# Patient Record
Sex: MALE | Race: ASIAN | NOT HISPANIC OR LATINO | URBAN - METROPOLITAN AREA
[De-identification: names, ages, dates, MRNs, and addresses within clinical notes are randomized per-mention and may not be internally consistent; named-entity substitution may affect disease eponyms.]

---

## 2023-10-04 ENCOUNTER — INPATIENT (INPATIENT)
Facility: HOSPITAL | Age: 53
LOS: 1 days | Discharge: HOME | End: 2023-10-06
Attending: STUDENT IN AN ORGANIZED HEALTH CARE EDUCATION/TRAINING PROGRAM | Admitting: STUDENT IN AN ORGANIZED HEALTH CARE EDUCATION/TRAINING PROGRAM
Payer: MEDICAID

## 2023-10-04 VITALS
RESPIRATION RATE: 16 BRPM | DIASTOLIC BLOOD PRESSURE: 90 MMHG | HEART RATE: 74 BPM | SYSTOLIC BLOOD PRESSURE: 140 MMHG | TEMPERATURE: 97 F | OXYGEN SATURATION: 98 %

## 2023-10-04 DIAGNOSIS — I21.3 ST ELEVATION (STEMI) MYOCARDIAL INFARCTION OF UNSPECIFIED SITE: ICD-10-CM

## 2023-10-04 LAB
ALBUMIN SERPL ELPH-MCNC: 4.7 G/DL — SIGNIFICANT CHANGE UP (ref 3.3–5)
ALP SERPL-CCNC: 68 U/L — SIGNIFICANT CHANGE UP (ref 40–120)
ALT FLD-CCNC: 28 U/L — SIGNIFICANT CHANGE UP (ref 4–41)
ANION GAP SERPL CALC-SCNC: 14 MMOL/L — SIGNIFICANT CHANGE UP (ref 7–14)
APTT BLD: 32.6 SEC — SIGNIFICANT CHANGE UP (ref 24.5–35.6)
AST SERPL-CCNC: 81 U/L — HIGH (ref 4–40)
BASOPHILS # BLD AUTO: 0.04 K/UL — SIGNIFICANT CHANGE UP (ref 0–0.2)
BASOPHILS NFR BLD AUTO: 0.4 % — SIGNIFICANT CHANGE UP (ref 0–2)
BILIRUB SERPL-MCNC: 0.6 MG/DL — SIGNIFICANT CHANGE UP (ref 0.2–1.2)
BLD GP AB SCN SERPL QL: NEGATIVE — SIGNIFICANT CHANGE UP
BLOOD GAS VENOUS COMPREHENSIVE RESULT: SIGNIFICANT CHANGE UP
BUN SERPL-MCNC: 11 MG/DL — SIGNIFICANT CHANGE UP (ref 7–23)
CALCIUM SERPL-MCNC: 10.5 MG/DL — SIGNIFICANT CHANGE UP (ref 8.4–10.5)
CHLORIDE SERPL-SCNC: 97 MMOL/L — LOW (ref 98–107)
CO2 SERPL-SCNC: 23 MMOL/L — SIGNIFICANT CHANGE UP (ref 22–31)
CREAT SERPL-MCNC: 0.75 MG/DL — SIGNIFICANT CHANGE UP (ref 0.5–1.3)
EGFR: 107 ML/MIN/1.73M2 — SIGNIFICANT CHANGE UP
EOSINOPHIL # BLD AUTO: 0.02 K/UL — SIGNIFICANT CHANGE UP (ref 0–0.5)
EOSINOPHIL NFR BLD AUTO: 0.2 % — SIGNIFICANT CHANGE UP (ref 0–6)
GLUCOSE SERPL-MCNC: 130 MG/DL — HIGH (ref 70–99)
HCT VFR BLD CALC: 46.4 % — SIGNIFICANT CHANGE UP (ref 39–50)
HGB BLD-MCNC: 15.7 G/DL — SIGNIFICANT CHANGE UP (ref 13–17)
IANC: 6.84 K/UL — SIGNIFICANT CHANGE UP (ref 1.8–7.4)
IMM GRANULOCYTES NFR BLD AUTO: 0.3 % — SIGNIFICANT CHANGE UP (ref 0–0.9)
INR BLD: 0.94 RATIO — SIGNIFICANT CHANGE UP (ref 0.85–1.18)
LYMPHOCYTES # BLD AUTO: 2.4 K/UL — SIGNIFICANT CHANGE UP (ref 1–3.3)
LYMPHOCYTES # BLD AUTO: 23.8 % — SIGNIFICANT CHANGE UP (ref 13–44)
MCHC RBC-ENTMCNC: 25.7 PG — LOW (ref 27–34)
MCHC RBC-ENTMCNC: 33.8 GM/DL — SIGNIFICANT CHANGE UP (ref 32–36)
MCV RBC AUTO: 75.9 FL — LOW (ref 80–100)
MONOCYTES # BLD AUTO: 0.77 K/UL — SIGNIFICANT CHANGE UP (ref 0–0.9)
MONOCYTES NFR BLD AUTO: 7.6 % — SIGNIFICANT CHANGE UP (ref 2–14)
NEUTROPHILS # BLD AUTO: 6.84 K/UL — SIGNIFICANT CHANGE UP (ref 1.8–7.4)
NEUTROPHILS NFR BLD AUTO: 67.7 % — SIGNIFICANT CHANGE UP (ref 43–77)
NRBC # BLD: 0 /100 WBCS — SIGNIFICANT CHANGE UP (ref 0–0)
NRBC # FLD: 0 K/UL — SIGNIFICANT CHANGE UP (ref 0–0)
PLATELET # BLD AUTO: 416 K/UL — HIGH (ref 150–400)
POTASSIUM SERPL-MCNC: 4.2 MMOL/L — SIGNIFICANT CHANGE UP (ref 3.5–5.3)
POTASSIUM SERPL-SCNC: 4.2 MMOL/L — SIGNIFICANT CHANGE UP (ref 3.5–5.3)
PROT SERPL-MCNC: 8.3 G/DL — SIGNIFICANT CHANGE UP (ref 6–8.3)
PROTHROM AB SERPL-ACNC: 10.6 SEC — SIGNIFICANT CHANGE UP (ref 9.5–13)
RBC # BLD: 6.11 M/UL — HIGH (ref 4.2–5.8)
RBC # FLD: 15.9 % — HIGH (ref 10.3–14.5)
RH IG SCN BLD-IMP: POSITIVE — SIGNIFICANT CHANGE UP
SODIUM SERPL-SCNC: 134 MMOL/L — LOW (ref 135–145)
TROPONIN T, HIGH SENSITIVITY RESULT: 324 NG/L — CRITICAL HIGH
WBC # BLD: 10.1 K/UL — SIGNIFICANT CHANGE UP (ref 3.8–10.5)
WBC # FLD AUTO: 10.1 K/UL — SIGNIFICANT CHANGE UP (ref 3.8–10.5)

## 2023-10-04 PROCEDURE — 93010 ELECTROCARDIOGRAM REPORT: CPT

## 2023-10-04 PROCEDURE — 99291 CRITICAL CARE FIRST HOUR: CPT

## 2023-10-04 PROCEDURE — 92941 PRQ TRLML REVSC TOT OCCL AMI: CPT | Mod: RC

## 2023-10-04 PROCEDURE — 93458 L HRT ARTERY/VENTRICLE ANGIO: CPT | Mod: 26,XU

## 2023-10-04 PROCEDURE — 71045 X-RAY EXAM CHEST 1 VIEW: CPT | Mod: 26

## 2023-10-04 RX ORDER — ASPIRIN/CALCIUM CARB/MAGNESIUM 324 MG
324 TABLET ORAL ONCE
Refills: 0 | Status: COMPLETED | OUTPATIENT
Start: 2023-10-04 | End: 2023-10-04

## 2023-10-04 RX ORDER — ATORVASTATIN CALCIUM 80 MG/1
80 TABLET, FILM COATED ORAL AT BEDTIME
Refills: 0 | Status: DISCONTINUED | OUTPATIENT
Start: 2023-10-04 | End: 2023-10-06

## 2023-10-04 RX ORDER — TICAGRELOR 90 MG/1
90 TABLET ORAL EVERY 12 HOURS
Refills: 0 | Status: DISCONTINUED | OUTPATIENT
Start: 2023-10-05 | End: 2023-10-06

## 2023-10-04 RX ORDER — ASPIRIN/CALCIUM CARB/MAGNESIUM 324 MG
81 TABLET ORAL DAILY
Refills: 0 | Status: DISCONTINUED | OUTPATIENT
Start: 2023-10-05 | End: 2023-10-06

## 2023-10-04 RX ORDER — HEPARIN SODIUM 5000 [USP'U]/ML
INJECTION INTRAVENOUS; SUBCUTANEOUS
Qty: 25000 | Refills: 0 | Status: DISCONTINUED | OUTPATIENT
Start: 2023-10-04 | End: 2023-10-04

## 2023-10-04 RX ORDER — TICAGRELOR 90 MG/1
180 TABLET ORAL ONCE
Refills: 0 | Status: COMPLETED | OUTPATIENT
Start: 2023-10-04 | End: 2023-10-04

## 2023-10-04 RX ORDER — HEPARIN SODIUM 5000 [USP'U]/ML
4000 INJECTION INTRAVENOUS; SUBCUTANEOUS EVERY 6 HOURS
Refills: 0 | Status: DISCONTINUED | OUTPATIENT
Start: 2023-10-04 | End: 2023-10-04

## 2023-10-04 RX ORDER — HEPARIN SODIUM 5000 [USP'U]/ML
4000 INJECTION INTRAVENOUS; SUBCUTANEOUS ONCE
Refills: 0 | Status: COMPLETED | OUTPATIENT
Start: 2023-10-04 | End: 2023-10-04

## 2023-10-04 RX ORDER — ACETAMINOPHEN 500 MG
650 TABLET ORAL EVERY 4 HOURS
Refills: 0 | Status: DISCONTINUED | OUTPATIENT
Start: 2023-10-04 | End: 2023-10-06

## 2023-10-04 RX ADMIN — ATORVASTATIN CALCIUM 80 MILLIGRAM(S): 80 TABLET, FILM COATED ORAL at 23:30

## 2023-10-04 RX ADMIN — HEPARIN SODIUM 800 UNIT(S)/HR: 5000 INJECTION INTRAVENOUS; SUBCUTANEOUS at 19:04

## 2023-10-04 RX ADMIN — Medication 650 MILLIGRAM(S): at 23:30

## 2023-10-04 RX ADMIN — Medication 324 MILLIGRAM(S): at 18:52

## 2023-10-04 RX ADMIN — HEPARIN SODIUM 4000 UNIT(S): 5000 INJECTION INTRAVENOUS; SUBCUTANEOUS at 19:04

## 2023-10-04 RX ADMIN — TICAGRELOR 180 MILLIGRAM(S): 90 TABLET ORAL at 19:04

## 2023-10-04 NOTE — ED PROVIDER NOTE - OBJECTIVE STATEMENT
Dick ENNIS: Patient is a 52-year-old male with a history of hypertension here for evaluation of left-sided burning chest pain since 4 PM.  Patient states that he was just laying in bed when the pain started.  Pain radiates to his back.  He denies any recent illness.  No fevers, chills, nausea, vomiting.  No radiation to his abdomen.  Denies radiation to his neck or arm.

## 2023-10-04 NOTE — H&P ADULT - NSHPLABSRESULTS_GEN_ALL_CORE
Vitals:  ============  T(F): 97.8 (04 Oct 2023 19:13), Max: 97.8 (04 Oct 2023 19:13)  HR: 65 (04 Oct 2023 19:13)  BP: 135/69 (04 Oct 2023 19:13)  RR: 15 (04 Oct 2023 19:13)  SpO2: 100% (04 Oct 2023 19:13) (98% - 100%)  temp max in last 48H T(F): , Max: 97.8 (10-04-23 @ 19:13)    =======================================================  Current Antibiotics:    Other medications:  atorvastatin 80 milliGRAM(s) Oral at bedtime      =======================================================  Labs:                        15.7   10.10 )-----------( 416      ( 04 Oct 2023 18:46 )             46.4     10-04    134<L>  |  97<L>  |  11  ----------------------------<  130<H>  4.2   |  23  |  0.75    Ca    10.5      04 Oct 2023 18:46    TPro  8.3  /  Alb  4.7  /  TBili  0.6  /  DBili  x   /  AST  81<H>  /  ALT  28  /  AlkPhos  68  10-04      Creatinine: 0.75 mg/dL (10-04-23 @ 18:46)            WBC Count: 10.10 K/uL (10-04-23 @ 18:46)        Alkaline Phosphatase: 68 U/L (10-04-23 @ 18:46)  Alanine Aminotransferase (ALT/SGPT): 28 U/L (10-04-23 @ 18:46)  Aspartate Aminotransferase (AST/SGOT): 81 U/L (10-04-23 @ 18:46)  Bilirubin Total: 0.6 mg/dL (10-04-23 @ 18:46)

## 2023-10-04 NOTE — ED ADULT NURSE NOTE - OBJECTIVE STATEMENT
pt received to , a&ox , ambulatory , phx of HTN , p/w CP x 2 days worsening today  . STEMI activated and pt being take to Cath Lab. pt medicated per MD order. Pt breathing even and unlabored on room air Denies fever, chills, cough, SOB, , palpitations, dizziness, nausea, vomiting, diarrhea, constipation, numbness, tingling. IV placed. Labs collected and sent. EKG in chart. pending  Cath Lab . pt educated on fall precautions and confirms understanding via teach back method. Stretcher locked in lowest position with siderails up x2. Call bell and personal items within reach.

## 2023-10-04 NOTE — ED PROVIDER NOTE - PROGRESS NOTE DETAILS
Discussed with fellow. EKG sent to [email or web address] called and attempting to get in touch with interventionalist. EKG sent to [email or web address] Discussed with Dr. Constantine Burrows, advised start aspirin, brilinta, heparin. Patent to go to cath lab.

## 2023-10-04 NOTE — H&P ADULT - NSHPPHYSICALEXAM_GEN_ALL_CORE
Constitutional: NAD, well developed, well nourished  Neuro: A+O x 3, non-focal, speech clear and intact  HEENT: NC/AT, PERRL, EOMI, anicteric sclerae, oral mucosa pink and moist  Neck: supple, no JVD  CV: regular rate, regular rhythm, +S1S2, no murmurs or rub  Pulm/chest: lung sounds CTA and equal bilaterally, no accessory muscle use noted  Abd: soft, NT, ND, +BS  Ext: LINCOLN x 4, no C/C/E  Skin: warm, well perfused  Psych: calm, appropriate affect

## 2023-10-04 NOTE — ED ADULT TRIAGE NOTE - CHIEF COMPLAINT QUOTE
chest pain since 4pm, denies SOB, RR even and unlabored, pt noted to have facial droop states has had it for 17 years

## 2023-10-04 NOTE — H&P ADULT - HISTORY OF PRESENT ILLNESS
52-year-old male with a history of hypertension here for evaluation of left-sided burning chest pain since 4 PM.  Patient states that he was just laying in bed when the pain started.  Pain radiates to his back.   EKG shows ST elevations in inferior leads.  He denies any recent illness.  No fevers, chills, nausea, vomiting.  No radiation to his abdomen.  Denies radiation to his neck or arm.  52-year-old male with a history of hypertension and hyperlipidemia presented to ED with 10/10  left-sided burning chest pain since 4 AM which woke him up from sleep. He tried water, gas pills without any response. He came to ED around 5 pm. Patient report intermittent mild chest pain for 2 month which resolve after gas pills. Patient states that  today he was just laying in bed when the pain started.  Pain radiates to his back. He reports non adherence to medications   EKG shows ST elevations in inferior leads.  He denies any recent illness.  No fevers, chills, nausea, vomiting.  No radiation to his abdomen.  Denies radiation to his neck or arm.

## 2023-10-04 NOTE — ED ADULT NURSE NOTE - NSFALLUNIVINTERV_ED_ALL_ED
Bed/Stretcher in lowest position, wheels locked, appropriate side rails in place/Call bell, personal items and telephone in reach/Instruct patient to call for assistance before getting out of bed/chair/stretcher/Non-slip footwear applied when patient is off stretcher/[place] to call system/Physically safe environment - no spills, clutter or unnecessary equipment/Purposeful proactive rounding/Room/bathroom lighting operational, light cord in reach

## 2023-10-04 NOTE — ED PROVIDER NOTE - CLINICAL SUMMARY MEDICAL DECISION MAKING FREE TEXT BOX
Dick ENNIS:   52-year-old male with a history of hypertension here for left-sided chest pain since 4 PM.  EKG shows ST elevations in inferior leads.  STEMI code called.    EKG sent to [email or web address]. Discussed with cardiologist.  Plan for aspirin, Brilinta, heparin. Dick ENNIS:   52-year-old male with a history of hypertension here for left-sided chest pain since 4 PM.  EKG shows ST elevations in inferior leads.  STEMI code called.    EKG sent to [email or web address]. Discussed with cardiologist.  Plan for aspirin, Brilinta, heparin. Patient to go for cath for urgent intervention.

## 2023-10-04 NOTE — ED PROVIDER NOTE - CRITICAL CARE ATTENDING CONTRIBUTION TO CARE
52-year-old male with a history of hypertension here for left-sided chest pain since 4 PM.  EKG shows ST elevations in inferior leads.  STEMI code called. EKG sent to [email or web address]. On, exam, patient appears uncomfortable without focal findings. Discussed with cardiologist.  Plan for aspirin, Brilinta, heparin. Patient to go for cath for urgent intervention.

## 2023-10-04 NOTE — ED ADULT TRIAGE NOTE - CCCP TRG CHIEF CMPLNT
What Type Of Note Output Would You Prefer (Optional)?: Bullet Format
How Severe Is Your Rash?: mild
Is This A New Presentation, Or A Follow-Up?: Rash
chest pain

## 2023-10-04 NOTE — ED PROVIDER NOTE - CONSTITUTIONAL, MLM
normal... Well appearing, awake, alert, oriented to person, place, time/situation, uncomfortable Awake, alert, oriented to person, place, time/situation, uncomfortable

## 2023-10-04 NOTE — H&P ADULT - NSHPSOCIALHISTORY_GEN_ALL_CORE
Social History:    Marital Status:  (   )    (   ) Single    (   )    (  )   Occupation:   Lives with: (  ) alone  (  ) children   (  ) spouse   (  ) parents  (  ) other    Substance Use (street drugs): (  ) never used  (  ) other:  Tobacco Usage:  (   ) never smoked   (   ) former smoker   (   ) current smoker  (     ) pack year  (        ) last cigarette date  Alcohol Usage:  Sexual History:     (     ) Advanced Directives: (    x ) None    (      ) DNR    (     ) DNI    (     ) Health Care Proxy:

## 2023-10-04 NOTE — H&P ADULT - ASSESSMENT
Neuro:   - AxOx3  - No issues       Cardiovascular:  ##STEMI  -Patient presented to ER with c/o chest pain. EKG revealed ST elevation inf leads.  Initial set cardiac enzyme   -Given active chest pain and TUCKER, s/p urgent cath. /Brilinta 180 mg and heparin loaded in ED  -Cath showed mRCA occlusion DESx2, mLAD90% stage PCI prior to DC  -Admit to CCU  -Assess for resolution of chest pain and for recurrent chest pain  -Serial EKG to assess for resolution of ST changes  -Monitor vital signs to monitor hemodynamic stability  -Continuous cardiac monitoring to monitor for arrhythmias  -CBC, CMP, coags, HbA1C, TSH, lipids for comorbidities, Trend cardiac enzymes  -Aspirin 81 PO daily, Brilinta 90 PO BID, Lipitor 80 mg PO daily  -Post cardiac cath care as per protocol.   -Check radial/groin site for hematoma or bleeding.  -Introduce beta blockers as tolerated: Metoprolol 12.5 mg BID  -Introduce ACE as tolerated. Hold for now due to recent dye load due to cardiac cath.  -Low fat DASH diet  -ECHO: 2D ECHO in 3-4 days to reevaluate LV function and to r/o thrombus.    Respiratory:   - No acute distress,         GI:   - Dash diet      :   - Monitor renal function trend BUN/Creatinine,   - Strict I/O's, daily weights      Endocrine:   - Check Ha1c, TSH      Heme:   - DVT prophylaxis with venodynes      ID: afebrile  Skin: no issues  Lines/Tubes: PIV x 2  Ethics: Full code  Dispo: Admit to CCU

## 2023-10-04 NOTE — H&P ADULT - NSHPREVIEWOFSYSTEMS_GEN_ALL_CORE
REVIEW OF SYSTEMS:  CV: chest pain (+), palpitation (-), orthopnea (-), PND (-), edema (-)  PULM: SOB (-), cough (-), wheezing (-), hemoptysis (-).   CONST: fever (-), chills (-) or fatigue (-)  GI: abdominal distension (-), abdominal pain (-) , nausea/vomiting (-), hematemesis, (-), melena (-), hematochezia (-)  : dysuria (-), frequency (-), hematuria (-).   NEURO: numbness (-), weakness (-), dizziness (-)  SKIN: itching (-), rash (-)  HEENT:  visual changes (-); vertigo or throat pain (-);  neck stiffness (-)     All other review of systems is negative unless indicated above.

## 2023-10-05 ENCOUNTER — TRANSCRIPTION ENCOUNTER (OUTPATIENT)
Age: 53
End: 2023-10-05

## 2023-10-05 LAB
A1C WITH ESTIMATED AVERAGE GLUCOSE RESULT: 5.7 % — HIGH (ref 4–5.6)
ALBUMIN SERPL ELPH-MCNC: 4.5 G/DL — SIGNIFICANT CHANGE UP (ref 3.3–5)
ALP SERPL-CCNC: 65 U/L — SIGNIFICANT CHANGE UP (ref 40–120)
ALT FLD-CCNC: 35 U/L — SIGNIFICANT CHANGE UP (ref 4–41)
ANION GAP SERPL CALC-SCNC: 18 MMOL/L — HIGH (ref 7–14)
APTT BLD: 30.8 SEC — SIGNIFICANT CHANGE UP (ref 24.5–35.6)
AST SERPL-CCNC: 156 U/L — HIGH (ref 4–40)
BILIRUB SERPL-MCNC: 0.9 MG/DL — SIGNIFICANT CHANGE UP (ref 0.2–1.2)
BUN SERPL-MCNC: 10 MG/DL — SIGNIFICANT CHANGE UP (ref 7–23)
CALCIUM SERPL-MCNC: 9.8 MG/DL — SIGNIFICANT CHANGE UP (ref 8.4–10.5)
CHLORIDE SERPL-SCNC: 97 MMOL/L — LOW (ref 98–107)
CHOLEST SERPL-MCNC: 169 MG/DL — SIGNIFICANT CHANGE UP
CK MB BLD-MCNC: 6.6 % — HIGH (ref 0–2.5)
CK MB BLD-MCNC: 9.9 % — HIGH (ref 0–2.5)
CK MB CFR SERPL CALC: 114.7 NG/ML — HIGH
CK MB CFR SERPL CALC: 190.4 NG/ML — HIGH
CK SERPL-CCNC: 1741 U/L — HIGH (ref 30–200)
CK SERPL-CCNC: 1926 U/L — HIGH (ref 30–200)
CO2 SERPL-SCNC: 22 MMOL/L — SIGNIFICANT CHANGE UP (ref 22–31)
CREAT SERPL-MCNC: 0.73 MG/DL — SIGNIFICANT CHANGE UP (ref 0.5–1.3)
EGFR: 108 ML/MIN/1.73M2 — SIGNIFICANT CHANGE UP
ESTIMATED AVERAGE GLUCOSE: 117 — SIGNIFICANT CHANGE UP
GLUCOSE SERPL-MCNC: 139 MG/DL — HIGH (ref 70–99)
HCT VFR BLD CALC: 44.2 % — SIGNIFICANT CHANGE UP (ref 39–50)
HDLC SERPL-MCNC: 45 MG/DL — SIGNIFICANT CHANGE UP
HGB BLD-MCNC: 15 G/DL — SIGNIFICANT CHANGE UP (ref 13–17)
INR BLD: 0.97 RATIO — SIGNIFICANT CHANGE UP (ref 0.85–1.18)
LACTATE SERPL-SCNC: 2.7 MMOL/L — HIGH (ref 0.5–2)
LACTATE SERPL-SCNC: 2.7 MMOL/L — HIGH (ref 0.5–2)
LIPID PNL WITH DIRECT LDL SERPL: 59 MG/DL — SIGNIFICANT CHANGE UP
MAGNESIUM SERPL-MCNC: 2.3 MG/DL — SIGNIFICANT CHANGE UP (ref 1.6–2.6)
MCHC RBC-ENTMCNC: 25.7 PG — LOW (ref 27–34)
MCHC RBC-ENTMCNC: 33.9 GM/DL — SIGNIFICANT CHANGE UP (ref 32–36)
MCV RBC AUTO: 75.7 FL — LOW (ref 80–100)
NON HDL CHOLESTEROL: 124 MG/DL — SIGNIFICANT CHANGE UP
NRBC # BLD: 0 /100 WBCS — SIGNIFICANT CHANGE UP (ref 0–0)
NRBC # FLD: 0 K/UL — SIGNIFICANT CHANGE UP (ref 0–0)
NT-PROBNP SERPL-SCNC: 754 PG/ML — HIGH
PHOSPHATE SERPL-MCNC: 2.9 MG/DL — SIGNIFICANT CHANGE UP (ref 2.5–4.5)
PLATELET # BLD AUTO: 398 K/UL — SIGNIFICANT CHANGE UP (ref 150–400)
POTASSIUM SERPL-MCNC: 4.1 MMOL/L — SIGNIFICANT CHANGE UP (ref 3.5–5.3)
POTASSIUM SERPL-SCNC: 4.1 MMOL/L — SIGNIFICANT CHANGE UP (ref 3.5–5.3)
PROT SERPL-MCNC: 7.8 G/DL — SIGNIFICANT CHANGE UP (ref 6–8.3)
PROTHROM AB SERPL-ACNC: 10.9 SEC — SIGNIFICANT CHANGE UP (ref 9.5–13)
RBC # BLD: 5.84 M/UL — HIGH (ref 4.2–5.8)
RBC # FLD: 15.2 % — HIGH (ref 10.3–14.5)
RH IG SCN BLD-IMP: POSITIVE — SIGNIFICANT CHANGE UP
SODIUM SERPL-SCNC: 137 MMOL/L — SIGNIFICANT CHANGE UP (ref 135–145)
TRIGL SERPL-MCNC: 327 MG/DL — HIGH
TROPONIN T, HIGH SENSITIVITY RESULT: 2081 NG/L — CRITICAL HIGH
TROPONIN T, HIGH SENSITIVITY RESULT: 2332 NG/L — CRITICAL HIGH
TSH SERPL-MCNC: 1.7 UIU/ML — SIGNIFICANT CHANGE UP (ref 0.27–4.2)
WBC # BLD: 10.36 K/UL — SIGNIFICANT CHANGE UP (ref 3.8–10.5)
WBC # FLD AUTO: 10.36 K/UL — SIGNIFICANT CHANGE UP (ref 3.8–10.5)

## 2023-10-05 PROCEDURE — 99233 SBSQ HOSP IP/OBS HIGH 50: CPT

## 2023-10-05 RX ORDER — CHLORHEXIDINE GLUCONATE 213 G/1000ML
1 SOLUTION TOPICAL
Refills: 0 | Status: DISCONTINUED | OUTPATIENT
Start: 2023-10-05 | End: 2023-10-06

## 2023-10-05 RX ORDER — PANTOPRAZOLE SODIUM 20 MG/1
40 TABLET, DELAYED RELEASE ORAL
Refills: 0 | Status: DISCONTINUED | OUTPATIENT
Start: 2023-10-05 | End: 2023-10-06

## 2023-10-05 RX ORDER — SODIUM CHLORIDE 9 MG/ML
500 INJECTION INTRAMUSCULAR; INTRAVENOUS; SUBCUTANEOUS
Refills: 0 | Status: DISCONTINUED | OUTPATIENT
Start: 2023-10-05 | End: 2023-10-06

## 2023-10-05 RX ORDER — METOPROLOL TARTRATE 50 MG
25 TABLET ORAL
Refills: 0 | Status: DISCONTINUED | OUTPATIENT
Start: 2023-10-05 | End: 2023-10-06

## 2023-10-05 RX ORDER — TICAGRELOR 90 MG/1
1 TABLET ORAL
Qty: 60 | Refills: 0
Start: 2023-10-05 | End: 2023-11-03

## 2023-10-05 RX ORDER — HEPARIN SODIUM 5000 [USP'U]/ML
5000 INJECTION INTRAVENOUS; SUBCUTANEOUS EVERY 8 HOURS
Refills: 0 | Status: DISCONTINUED | OUTPATIENT
Start: 2023-10-05 | End: 2023-10-06

## 2023-10-05 RX ADMIN — Medication 650 MILLIGRAM(S): at 00:20

## 2023-10-05 RX ADMIN — Medication 81 MILLIGRAM(S): at 11:32

## 2023-10-05 RX ADMIN — Medication 25 MILLIGRAM(S): at 18:35

## 2023-10-05 RX ADMIN — HEPARIN SODIUM 5000 UNIT(S): 5000 INJECTION INTRAVENOUS; SUBCUTANEOUS at 11:32

## 2023-10-05 RX ADMIN — Medication 650 MILLIGRAM(S): at 06:33

## 2023-10-05 RX ADMIN — PANTOPRAZOLE SODIUM 40 MILLIGRAM(S): 20 TABLET, DELAYED RELEASE ORAL at 06:33

## 2023-10-05 RX ADMIN — Medication 650 MILLIGRAM(S): at 07:30

## 2023-10-05 RX ADMIN — TICAGRELOR 90 MILLIGRAM(S): 90 TABLET ORAL at 17:02

## 2023-10-05 RX ADMIN — CHLORHEXIDINE GLUCONATE 1 APPLICATION(S): 213 SOLUTION TOPICAL at 11:33

## 2023-10-05 RX ADMIN — SODIUM CHLORIDE 100 MILLILITER(S): 9 INJECTION INTRAMUSCULAR; INTRAVENOUS; SUBCUTANEOUS at 11:34

## 2023-10-05 RX ADMIN — HEPARIN SODIUM 5000 UNIT(S): 5000 INJECTION INTRAVENOUS; SUBCUTANEOUS at 23:16

## 2023-10-05 RX ADMIN — ATORVASTATIN CALCIUM 80 MILLIGRAM(S): 80 TABLET, FILM COATED ORAL at 23:16

## 2023-10-05 RX ADMIN — TICAGRELOR 90 MILLIGRAM(S): 90 TABLET ORAL at 06:33

## 2023-10-05 NOTE — DISCHARGE NOTE PROVIDER - HOSPITAL COURSE
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 He tried water, gas pills without any response. He came to ED around 5 pm. Patient report intermittent mild chest pain for 2 month which resolve after gas pills. Patient states that  today he was just laying in bed when the pain started.  Pain radiates to his back. He reports non adherence to medications   EKG shows ST elevations in inferior leads. S/p one stent to  100% pRCA and 2 stents to  100% mRCA . EDP 19 -  IVF 250cc x1 in cath.  Plan for stage mLAD 70% via Right radial. 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 He tried water, gas pills without any response. He came to ED around 5 pm. Patient report intermittent mild chest pain for 2 month which resolve after gas pills. Patient states that  today he was just laying in bed when the pain started.  Pain radiates to his back. He reports non adherence to medications   EKG shows ST elevations in inferior leads. S/p one stent to  100% pRCA and 2 stents to  100% mRCA . EDP 19 -  IVF 250cc x1 in cath. Initial plan to  stage mLAD 70% via Right radial. Rcho showed Left ventricular systolic function is normal with an ejection fraction of 64 % by 3D. Patient chest pain . Plan to medical manage LAD and follow up with Dr Burrows. 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 He tried water, gas pills without any response. He came to ED around 5 pm. Patient report intermittent mild chest pain for 2 month which resolve after gas pills. Patient states that  today he was just laying in bed when the pain started.  Pain radiates to his back. He reports non adherence to medications   EKG shows ST elevations in inferior leads. S/p one stent to  100% pRCA and 2 stents to  100% mRCA . EDP 19 -  IVF 250cc x1 in cath. Initial plan to  stage mLAD 70% via Right radial. Rcho showed Left ventricular systolic function is normal with an ejection fraction of 64 % by 3D. Patient chest pain . Plan to medical management LAD and follow up with Dr Burrows. 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 He tried water, gas pills without any response. He came to ED around 5 pm. Patient report intermittent mild chest pain for 2 month which resolve after gas pills. Patient states that  today he was just laying in bed when the pain started.  Pain radiates to his back. He reports non adherence to medications   EKG shows ST elevations in inferior leads. S/p one stent to  100% pRCA and 2 stents to  100% mRCA . EDP 19 -  IVF 250cc x1 in cath. Initial plan to  stage mLAD 60% via Right radial. Rcho showed Left ventricular systolic function is normal with an ejection fraction of 64 % by 3D. Patient chest pain free. Plan to medical management LAD and follow up with Dr Burrows. 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 He tried water, gas pills without any response. He came to ED around 5 pm. Patient report intermittent mild chest pain for 2 month which resolve after gas pills. Patient states that  today he was just laying in bed when the pain started.  Pain radiates to his back. He reports non adherence to medications   EKG shows ST elevations in inferior leads. S/p one stent to  100% pRCA and 2 stents to  100% mRCA . EDP 19 -  IVF 250cc x1 in cath. Initial plan to  stage mLAD 60% via Right radial. Rcho showed Left ventricular systolic function is normal with an ejection fraction of 64 % by 3D. Patient chest pain free. Plan to medical management LAD and follow up with Dr Burrows. Appt scheduled for 10/11 at 1:20pm at cardiology office.

## 2023-10-05 NOTE — PROGRESS NOTE ADULT - ASSESSMENT
52-year-old male with a history of hypertension presents with chest pain radiating to upper back and left  upper shoulder found to have IWMI by EKG. s/p one stent to  100% pRCA and 2 stents to  100% mRCA . Plan for stage mLAD 70% via Right radial.     Neuro:   - AxOx3  - No issues       Cardiovascular:  # IWSTEMI  -Patient presented to ER with c/o chest pain. EKG revealed ST elevation inf leads.    -Given active chest pain and TUCKER, s/p urgent cath. /Brilinta 180 mg and heparin loaded in ED  -Cath showed mRCA occlusion DESx2, and pRCA x1 mLAD90% stage PCI prior to DC  -Admit to CCU  -Assess for resolution of chest pain and for recurrent chest pain  -Serial EKG to assess for resolution of ST changes  -Monitor vital signs to monitor hemodynamic stability  -Continuous cardiac monitoring to monitor for arrhythmias  -Trend cardiac enzymes: 324-> 2081  -Aspirin 81 PO daily, Brilinta 90 PO BID, Lipitor 80 mg PO daily  -Post cardiac cath care as per protocol.   - radial site for hematoma or bleeding.  -Introduce beta blockers as tolerated: Metoprolol 12.5 mg BID  -Introduce ACE as tolerated. Hold for now due to recent dye load due to cardiac cath.  -Low fat DASH diet  -ECHO: 2D ECHO i LV function .    Respiratory:   - No acute distress,         GI:   - Dash diet      :   - scr 0.7  - Monitor renal function trend BUN/Creatinine,   - Strict I/O's, daily weights      Endocrine:   -  Ha1c 5.7   TSH 1.7      Heme:   - DVT prophylaxis with heparin SQ      ID: afebrile  Skin: no issues  Lines/Tubes: PIV x 2  Ethics: Full code  Dispo: Admit to CCU       52-year-old male with a history of hypertension and hyperlipidemia  presents with chest pain radiating to upper back and left  upper shoulder found to have IWMI by EKG. s/p one stent to  100% pRCA and 2 stents to  100% mRCA . Plan for stage mLAD 70% via Right radial.    Plan     Neuro:   - AxOx3  - No issues       Cardiovascular:  # IWSTEMI  -Patient presented to ER with c/o chest pain. EKG revealed ST elevation inf leads.    -Given active chest pain and TUCKER, s/p urgent cath. /Brilinta 180 mg and heparin loaded in ED  -Cath showed mRCA occlusion DESx2, and pRCA x1 mLAD90% stage PCI prior to DC  -Admit to CCU  -Assess for resolution of chest pain and for recurrent chest pain  -Serial EKG to assess for resolution of ST changes  -Monitor vital signs to monitor hemodynamic stability  -Continuous cardiac monitoring to monitor for arrhythmias  -Trend cardiac enzymes: 324-> 2081  -Aspirin 81 PO daily, Brilinta 90 PO BID, Lipitor 80 mg PO daily  -Post cardiac cath care as per protocol.   - radial site for hematoma or bleeding.  -Introduce beta blockers as tolerated: Metoprolol 12.5 mg BID  -Introduce ACE as tolerated. Hold for now due to recent dye load due to cardiac cath.  -Low fat DASH diet  -ECHO: 2D ECHO i LV function .    Respiratory:   - No acute distress         GI:   - Dash diet      :   - scr 0.7  - Monitor renal function trend BUN/Creatinine,   - Strict I/O's, daily weights      Endocrine:   -  Ha1c 5.7   TSH 1.7      Heme:   - DVT prophylaxis with heparin SQ      ID: afebrile  Skin: no issues  Lines/Tubes: PIV x 2  Ethics: Full code  Dispo: Admit to CCU       52-year-old male with a history of hypertension and hyperlipidemia  presents with chest pain radiating to upper back and left  upper shoulder found to have IWMI by EKG. s/p one stent to  100% pRCA and 2 stents to  100% mRCA . Plan for stage mLAD 70% via Right radial.    Plan     Neuro:   - AxOx3  - No issues       Cardiovascular:  # IWSTEMI  -Patient presented to ER with c/o chest pain. EKG revealed ST elevation inf leads.    -Given active chest pain and TUCKER, s/p urgent cath. /Brilinta 180 mg and heparin loaded in ED  -Cath showed mRCA occlusion DESx2, and pRCA x1 mLAD90% stage PCI prior to DC  -Admit to CCU  -Assess for resolution of chest pain and for recurrent chest pain  -Serial EKG to assess for resolution of ST changes  -Monitor vital signs to monitor hemodynamic stability  -Continuous cardiac monitoring to monitor for arrhythmias  -Trend cardiac enzymes: 324-> 2081  -Aspirin 81 PO daily, Brilinta 90 PO BID, Lipitor 80 mg PO daily  -Introduce ACE as tolerated. Hold for now due to recent dye load due to cardiac cath.  -Low fat DASH diet  -ECHO: Left ventricular systolic function is normal with an ejection fraction of 64 % by 3D  - will give  500cc IVF x1 today     Respiratory:   - No acute distress         GI:   - Dash diet      :   - scr 0.7  - Monitor renal function trend BUN/Creatinine,   - Strict I/O's, daily weights      Endocrine:   -  Ha1c 5.7   TSH 1.7      Heme:   - DVT prophylaxis with heparin SQ      ID: afebrile  Skin: no issues  Lines/Tubes: PIV x 2  Ethics: Full code  Dispo: Admit to CCU       52-year-old male with a history of hypertension and hyperlipidemia, right eye blindness 2/2 trauma  presents with chest pain radiating to upper back and left  upper shoulder found to have IWMI by EKG. s/p one stent to  100% pRCA and 2 stents to  100% mRCA . Plan for stage mLAD 70% via Right radial.    Plan     Neuro:   - AxOx3  - No issues       Cardiovascular:  # IWSTEMI  -Patient presented to ER with c/o chest pain. EKG revealed ST elevation inf leads.    -Given active chest pain and TUCKER, s/p urgent cath. /Brilinta 180 mg and heparin loaded in ED  -Cath showed mRCA occlusion DESx2, and pRCA x1 mLAD90% stage PCI prior to DC  -Admit to CCU  -Assess for resolution of chest pain and for recurrent chest pain  -Serial EKG to assess for resolution of ST changes  -Monitor vital signs to monitor hemodynamic stability  -Continuous cardiac monitoring to monitor for arrhythmias  -Trend cardiac enzymes: 324-> 2081  -Aspirin 81 PO daily, Brilinta 90 PO BID, Lipitor 80 mg PO daily  -Introduce ACE as tolerated. Hold for now due to recent dye load due to cardiac cath.  -Low fat DASH diet  -ECHO: Left ventricular systolic function is normal with an ejection fraction of 64 % by 3D  - will give  500cc IVF x1 today     Respiratory:   - No acute distress         GI:   - Dash diet      :   - scr 0.7  - Monitor renal function trend BUN/Creatinine,   - Strict I/O's, daily weights      Endocrine:   -  Ha1c 5.7   TSH 1.7      Heme:   - DVT prophylaxis with heparin SQ      ID: afebrile  Skin: no issues  Lines/Tubes: PIV x 2  Ethics: Full code  Dispo: Admit to CCU       52-year-old male with a history of hypertension and hyperlipidemia, right eye blindness 2/2 trauma  presents with chest pain radiating to upper back and left  upper shoulder found to have IWMI by EKG. s/p one stent to  100% pRCA and 2 stents to  100% mRCA . Plan for stage mLAD 70% via Right radial.    Plan     Neuro:   - AxOx3  - No issues       Cardiovascular:  # IWSTEMI  -Patient presented to ER with c/o chest pain. EKG revealed ST elevation inf leads.    -Given active chest pain and TUCKER, s/p urgent cath. /Brilinta 180 mg and heparin loaded in ED  -Cath showed mRCA occlusion DESx2, and pRCA x1 mLAD90% , medical mangement of m LAD  -Assess for resolution of chest pain and for recurrent chest pain  -Serial EKG to assess for resolution of ST changes  -Monitor vital signs to monitor hemodynamic stability  -Continuous cardiac monitoring to monitor for arrhythmias  -Trend cardiac enzymes: 324-> 2081  -Aspirin 81 PO daily, Brilinta 90 PO BID, Lipitor 80 mg PO daily  -Introduce ACE as tolerated. Hold for now due to recent dye load due to cardiac cath.  -Low fat DASH diet  -ECHO: Left ventricular systolic function is normal with an ejection fraction of 64 % by 3D  unknown etiology of   Lactate 2.7->will give  500cc IVF x1 today     Respiratory:   - No acute distress         GI:   - Dash diet      :   - scr 0.7  - Monitor renal function trend BUN/Creatinine,   - Strict I/O's, daily weights      Endocrine:   -  Ha1c 5.7   TSH 1.7      Heme:   - DVT prophylaxis with heparin SQ      ID: afebrile  Skin: no issues  Lines/Tubes: PIV x 2  Ethics: Full code  Dispo: Admit to CCU

## 2023-10-05 NOTE — DISCHARGE NOTE PROVIDER - NSDCMRMEDTOKEN_GEN_ALL_CORE_FT
Brilinta (ticagrelor) 90 mg oral tablet: 1 tab(s) orally 2 times a day   aspirin 81 mg oral delayed release tablet: 1 tab(s) orally once a day  atorvastatin 10 mg oral tablet: 1 tab(s) orally  losartan 50 mg oral tablet: 1 tab(s) orally  metoprolol succinate 50 mg oral tablet, extended release: 1 tab(s) orally once a day  ticagrelor 90 mg oral tablet: 1 tab(s) orally every 12 hours   aspirin 81 mg oral delayed release tablet: 1 tab(s) orally once a day  atorvastatin 80 mg oral tablet: 1 tab(s) orally once a day (at bedtime)  Cozaar 25 mg oral tablet: 12.5 milligram(s) orally once a day 1/2 tab once a day  metoprolol succinate 25 mg oral tablet, extended release: 1 tab(s) orally once a day  ticagrelor 90 mg oral tablet: 1 tab(s) orally every 12 hours   aspirin 81 mg oral delayed release tablet: 1 tab(s) orally once a day  Aspirin Enteric Coated 81 mg oral delayed release tablet: 1 tab(s) orally once a day  atorvastatin 80 mg oral tablet: 1 tab(s) orally once a day (at bedtime)  atorvastatin 80 mg oral tablet: 1 tab(s) orally once a day (at bedtime)  Brilinta (ticagrelor) 90 mg oral tablet: 1 tab(s) orally 2 times a day  Cozaar 25 mg oral tablet: 12.5 milligram(s) orally once a day 1/2 tab once a day  losartan 25 mg oral tablet: 0.5 tab(s) orally once a day Take half of the pill every day  metoprolol succinate 25 mg oral tablet, extended release: 1 tab(s) orally once a day  ticagrelor 90 mg oral tablet: 1 tab(s) orally every 12 hours  Toprol-XL 25 mg oral tablet, extended release: 1 tab(s) orally once a day   Aspirin Enteric Coated 81 mg oral delayed release tablet: 1 tab(s) orally once a day  atorvastatin 80 mg oral tablet: 1 tab(s) orally once a day (at bedtime)  Brilinta (ticagrelor) 90 mg oral tablet: 1 tab(s) orally 2 times a day  losartan 25 mg oral tablet: 0.5 tab(s) orally once a day Take half of the pill every day  Toprol-XL 25 mg oral tablet, extended release: 1 tab(s) orally once a day

## 2023-10-05 NOTE — PROGRESS NOTE ADULT - CRITICAL CARE ATTENDING COMMENT
IWMI s/p RCA PCI x 2  Normal LV function  Planned for stated LAD procedure today IWMI s/p RCA PCI x 2  Normal LV function  Continue DAPT, Stain, possible DC tomorrow

## 2023-10-05 NOTE — DISCHARGE NOTE PROVIDER - NSDCCPCAREPLAN_GEN_ALL_CORE_FT
PRINCIPAL DISCHARGE DIAGNOSIS  Diagnosis: STEMI (ST elevation myocardial infarction)  Assessment and Plan of Treatment: you have heart heart. 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 3 stents was placed in one artery. Plan to place stents in otherartery..      Ultrasound of your heart showed your heart muscls is normal     PRINCIPAL DISCHARGE DIAGNOSIS  Diagnosis: STEMI (ST elevation myocardial infarction)  Assessment and Plan of Treatment: you have heart heart. 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 3 stents was placed in one artery. Plan to  hold stent to second artery and continue medical treatemet with medicines. Ultrasound of your heart showed your heart muscls is normal. If you have please come to emergency room immediately.

## 2023-10-05 NOTE — DISCHARGE NOTE PROVIDER - CARE PROVIDER_API CALL
Constantine Burrows  Cardiovascular Disease  [address] [place] [address], [address] 2  [place] [address]  Phone: [phone]  Fax: [phone]  Follow Up Time: 1 week   Constantine Burrows  Cardiovascular Disease  [address] [place] [address], Floor 2  [place] [address]  Phone: [phone]  Fax: [phone]  Follow Up Time: 1 week    Teri Velasco  Internal Medicine  [address] [place] [address]  Phone: [phone]  Fax: [phone]  Established Patient  Follow Up Time: 1 week   Constantine Burrows  Cardiovascular Disease  [address] [place] [address], [address]  [place] [address]  Phone: [phone]  Fax: [phone]  Scheduled Appointment: 10/11/2023 01:20 PM    Teri Velasco  Internal Medicine  [address] [place] [address]  Phone: [phone]  Fax: [phone]  Established Patient  Follow Up Time: 1 week

## 2023-10-05 NOTE — DISCHARGE NOTE PROVIDER - NSDCFUSCHEDAPPT_GEN_ALL_CORE_FT
Constantine Burrows  [facility] Physician [facility]  CARDIOLOGY [id number]  Scheduled Appointment: 10/11/2023     Constantine Burrows  [facility] Physician [facility]  CARDIOLOGY [id number]  Scheduled Appointment: 11/15/2023

## 2023-10-05 NOTE — PROGRESS NOTE ADULT - SUBJECTIVE AND OBJECTIVE BOX
Subjective/Objective: Patient alert , oriented . Patient denies  SOB, dizziness, abdomen pain. Report 5/10 chest pain early morning which improve with tylenol, now 2/10      Tele event:  NSR 70-90    MEDICATIONS    aspirin enteric coated 81 milliGRAM(s) Oral daily  ticagrelor 90 milliGRAM(s) Oral every 12 hours  acetaminophen     Tablet .. 650 milliGRAM(s) Oral every 4 hours PRN  pantoprazole    Tablet 40 milliGRAM(s) Oral before breakfast  atorvastatin 80 milliGRAM(s) Oral at bedtime  chlorhexidine 2% Cloths 1 Application(s) Topical <User Schedule>                ICU Vital Signs Last 24 Hrs  T(C): 36.9 (05 Oct 2023 04:00), Max: 36.9 (04 Oct 2023 21:51)  T(F): 98.5 (05 Oct 2023 04:00), Max: 98.5 (05 Oct 2023 04:00)  HR: 71 (05 Oct 2023 07:00) (57 - 76)  BP: 125/82 (05 Oct 2023 07:00) (98/80 - 140/90)  BP(mean): 96 (05 Oct 2023 07:00) (74 - 97)  ABP: --  ABP(mean): --  RR: 23 (05 Oct 2023 07:00) (15 - 26)  SpO2: 96% (05 Oct 2023 07:00) (96% - 100%)    O2 Parameters below as of 05 Oct 2023 07:00  Patient On (Oxygen Delivery Method): room air            PHYSICAL EXAMINATION  Appearance: NAD, no distress  HEENT: Moist Mucous Membranes, Anicteric, PERRL, EOMI  Cardiovascular: Regular rate and rhythm, Normal S1 S2, No JVD, No murmurs  Respiratory: Lungs clear to auscultation. No rales, No rhonchi, No wheezing.   Gastrointestinal:  Soft, Non-tender, + BS  Neurologic: Non-focal, A&Ox3  Skin: Warm and dry, No rashes, No ecchymosis, No cyanosis  Musculoskeletal: No clubbing, No cyanosis, No edema  Vascular: Peripheral pulses palpable 2+ bilaterally  Psychiatry: Mood & affect appropriate      	    		      I&O's Summary    04 Oct 2023 07:01  -  05 Oct 2023 07:00  --------------------------------------------------------  IN: 200 mL / OUT: 1875 mL / NET: -1675 mL    	     LABS:	  LABORATORY VALUES	 	                          15.0   10.36 )-----------( 398      ( 05 Oct 2023 02:00 )             44.2       10-05    137  |  97<L>  |  10  ----------------------------<  139<H>  4.1   |  22  |  0.73  10-04    134<L>  |  97<L>  |  11  ----------------------------<  130<H>  4.2   |  23  |  0.75    Ca    9.8      05 Oct 2023 02:00  Ca    10.5      04 Oct 2023 18:46  Phos  2.9     10-05  Mg     2.30     10-05    TPro  7.8  /  Alb  4.5  /  TBili  0.9  /  DBili  x   /  AST  156<H>  /  ALT  35  /  AlkPhos  65  10-05  TPro  8.3  /  Alb  4.7  /  TBili  0.6  /  DBili  x   /  AST  81<H>  /  ALT  28  /  AlkPhos  68  10-04    LIVER FUNCTIONS - ( 05 Oct 2023 02:00 )  Alb: 4.5 g/dL / Pro: 7.8 g/dL / ALK PHOS: 65 U/L / ALT: 35 U/L / AST: 156 U/L / GGT: x           Activated Partial Thromboplastin Time: 30.8 sec (10-05 @ 02:00)      CARDIAC MARKERS:  Creatine Kinase, Serum: 1926 U/L (10-05 @ 02:00)            Lactate, Blood: 2.7 mmol/L (10-05 @ 02:00)  Blood Gas Venous - Lactate: 3.7 mmol/L (10-04 @ 18:46)    10-05 @ 02:00  Cholesterol, Serum - 169  Direct LDL- --  HDL Cholesterol, Serum- 45  Triglycerides, Serum- 327      Thyroid Stimulating Hormone, Serum: 1.70 uIU/mL (10-05 @ 02:00)      Urinalysis Basic - ( 05 Oct 2023 02:00 )    Color: x / Appearance: x / SG: x / pH: x  Gluc: 139 mg/dL / Ketone: x  / Bili: x / Urobili: x   Blood: x / Protein: x / Nitrite: x   Leuk Esterase: x / RBC: x / WBC x   Sq Epi: x / Non Sq Epi: x / Bacteria: x      CAPILLARY BLOOD GLUCOSE            CARDIAC MARKERS ( 05 Oct 2023 02:00 )  x     / x     / 1926 U/L / x     / 190.4 ng/mL        < from: Xray Chest 1 View- PORTABLE-Urgent (10.04.23 @ 19:27) >    INTERPRETATION:  Clear lungs    < end of copied text >             Subjective/Objective: Patient alert , oriented . Patient denies  SOB, dizziness, abdomen pain. Report 5/10 chest pain early morning which improve with Tylenol now 2/10      Tele event:  NSR 70-90    MEDICATIONS    aspirin enteric coated 81 milliGRAM(s) Oral daily  ticagrelor 90 milliGRAM(s) Oral every 12 hours  acetaminophen     Tablet .. 650 milliGRAM(s) Oral every 4 hours PRN  pantoprazole    Tablet 40 milliGRAM(s) Oral before breakfast  atorvastatin 80 milliGRAM(s) Oral at bedtime  chlorhexidine 2% Cloths 1 Application(s) Topical <User Schedule>              ICU Vital Signs Last 24 Hrs  T(C): 36.9 (05 Oct 2023 04:00), Max: 36.9 (04 Oct 2023 21:51)  T(F): 98.5 (05 Oct 2023 04:00), Max: 98.5 (05 Oct 2023 04:00)  HR: 71 (05 Oct 2023 07:00) (57 - 76)  BP: 125/82 (05 Oct 2023 07:00) (98/80 - 140/90)  BP(mean): 96 (05 Oct 2023 07:00) (74 - 97)  ABP: --  ABP(mean): --  RR: 23 (05 Oct 2023 07:00) (15 - 26)  SpO2: 96% (05 Oct 2023 07:00) (96% - 100%)    O2 Parameters below as of 05 Oct 2023 07:00  Patient On (Oxygen Delivery Method): room air            PHYSICAL EXAMINATION  Appearance: NAD, no distress  HEENT: Moist Mucous Membranes, Anicteric, PERRL, EOMI  Cardiovascular: Regular rate and rhythm, Normal S1 S2, No JVD, No murmurs  Respiratory: Lungs clear to auscultation. No rales, No rhonchi, No wheezing.   Gastrointestinal:  Soft, Non-tender, + BS  Neurologic: Non-focal, A&Ox3  Skin: Warm and dry, No rashes, No ecchymosis, No cyanosis  Musculoskeletal: No clubbing, No cyanosis, No edema  Vascular: Peripheral pulses palpable 2+ bilaterally  Psychiatry: Mood & affect appropriate      	    		      I&O's Summary    04 Oct 2023 07:01  -  05 Oct 2023 07:00  --------------------------------------------------------  IN: 200 mL / OUT: 1875 mL / NET: -1675 mL    	     LABS:	  LABORATORY VALUES	 	                          15.0   10.36 )-----------( 398      ( 05 Oct 2023 02:00 )             44.2       10-05    137  |  97<L>  |  10  ----------------------------<  139<H>  4.1   |  22  |  0.73  10-04    134<L>  |  97<L>  |  11  ----------------------------<  130<H>  4.2   |  23  |  0.75    Ca    9.8      05 Oct 2023 02:00  Ca    10.5      04 Oct 2023 18:46  Phos  2.9     10-05  Mg     2.30     10-05    TPro  7.8  /  Alb  4.5  /  TBili  0.9  /  DBili  x   /  AST  156<H>  /  ALT  35  /  AlkPhos  65  10-05  TPro  8.3  /  Alb  4.7  /  TBili  0.6  /  DBili  x   /  AST  81<H>  /  ALT  28  /  AlkPhos  68  10-04    LIVER FUNCTIONS - ( 05 Oct 2023 02:00 )  Alb: 4.5 g/dL / Pro: 7.8 g/dL / ALK PHOS: 65 U/L / ALT: 35 U/L / AST: 156 U/L / GGT: x           Activated Partial Thromboplastin Time: 30.8 sec (10-05 @ 02:00)      CARDIAC MARKERS:  Creatine Kinase, Serum: 1926 U/L (10-05 @ 02:00)            Lactate, Blood: 2.7 mmol/L (10-05 @ 02:00)  Blood Gas Venous - Lactate: 3.7 mmol/L (10-04 @ 18:46)    10-05 @ 02:00  Cholesterol, Serum - 169  Direct LDL- --  HDL Cholesterol, Serum- 45  Triglycerides, Serum- 327      Thyroid Stimulating Hormone, Serum: 1.70 uIU/mL (10-05 @ 02:00)      Urinalysis Basic - ( 05 Oct 2023 02:00 )    Color: x / Appearance: x / SG: x / pH: x  Gluc: 139 mg/dL / Ketone: x  / Bili: x / Urobili: x   Blood: x / Protein: x / Nitrite: x   Leuk Esterase: x / RBC: x / WBC x   Sq Epi: x / Non Sq Epi: x / Bacteria: x      CAPILLARY BLOOD GLUCOSE            CARDIAC MARKERS ( 05 Oct 2023 02:00 )  x     / x     / 1926 U/L / x     / 190.4 ng/mL        < from: Xray Chest 1 View- PORTABLE-Urgent (10.04.23 @ 19:27) >    INTERPRETATION:  Clear lungs    < end of copied text >             HPI:   52-year-old male with a history of hypertension and hyperlipidemia presented to ED with 10/10  left-sided burning chest pain since 4 AM which woke him up from sleep. He tried water, gas pills without any response. He came to ED around 5 pm. Patient report intermittent mild chest pain for 2 month which resolve after gas pills. Patient states that  today he was just laying in bed when the pain started.  Pain radiates to his back. He reports non adherence to medications   EKG shows ST elevations in inferior leads. S/p one stent to  100% pRCA and 2 stents to  100% mRCA . EDP 19 -  IVF 250cc x1 in cath.  Plan for stage mLAD 70% via Right radial.    Subjective/Objective: Patient alert , oriented . Patient denies  SOB, dizziness, abdomen pain. Report 5/10 chest pain early morning which improve with Tylenol now 2/10      Tele event:  NSR 70-90    MEDICATIONS    aspirin enteric coated 81 milliGRAM(s) Oral daily  ticagrelor 90 milliGRAM(s) Oral every 12 hours  acetaminophen     Tablet .. 650 milliGRAM(s) Oral every 4 hours PRN  pantoprazole    Tablet 40 milliGRAM(s) Oral before breakfast  atorvastatin 80 milliGRAM(s) Oral at bedtime  chlorhexidine 2% Cloths 1 Application(s) Topical <User Schedule>              ICU Vital Signs Last 24 Hrs  T(C): 36.9 (05 Oct 2023 04:00), Max: 36.9 (04 Oct 2023 21:51)  T(F): 98.5 (05 Oct 2023 04:00), Max: 98.5 (05 Oct 2023 04:00)  HR: 71 (05 Oct 2023 07:00) (57 - 76)  BP: 125/82 (05 Oct 2023 07:00) (98/80 - 140/90)  BP(mean): 96 (05 Oct 2023 07:00) (74 - 97)  ABP: --  ABP(mean): --  RR: 23 (05 Oct 2023 07:00) (15 - 26)  SpO2: 96% (05 Oct 2023 07:00) (96% - 100%)    O2 Parameters below as of 05 Oct 2023 07:00  Patient On (Oxygen Delivery Method): room air            PHYSICAL EXAMINATION  Appearance: NAD, no distress  HEENT: Moist Mucous Membranes, Anicteric, PERRL, EOMI  Cardiovascular: Regular rate and rhythm, Normal S1 S2, No JVD, No murmurs  Respiratory: Lungs clear to auscultation. No rales, No rhonchi, No wheezing.   Gastrointestinal:  Soft, Non-tender, + BS  Neurologic: Non-focal, A&Ox3  Skin: Warm and dry, No rashes, No ecchymosis, No cyanosis  Musculoskeletal: No clubbing, No cyanosis, No edema  Vascular: Peripheral pulses palpable 2+ bilaterally  Psychiatry: Mood & affect appropriate      	    		      I&O's Summary    04 Oct 2023 07:01  -  05 Oct 2023 07:00  --------------------------------------------------------  IN: 200 mL / OUT: 1875 mL / NET: -1675 mL    	     LABS:	  LABORATORY VALUES	 	                          15.0   10.36 )-----------( 398      ( 05 Oct 2023 02:00 )             44.2       10-05    137  |  97<L>  |  10  ----------------------------<  139<H>  4.1   |  22  |  0.73  10-04    134<L>  |  97<L>  |  11  ----------------------------<  130<H>  4.2   |  23  |  0.75    Ca    9.8      05 Oct 2023 02:00  Ca    10.5      04 Oct 2023 18:46  Phos  2.9     10-05  Mg     2.30     10-05    TPro  7.8  /  Alb  4.5  /  TBili  0.9  /  DBili  x   /  AST  156<H>  /  ALT  35  /  AlkPhos  65  10-05  TPro  8.3  /  Alb  4.7  /  TBili  0.6  /  DBili  x   /  AST  81<H>  /  ALT  28  /  AlkPhos  68  10-04    LIVER FUNCTIONS - ( 05 Oct 2023 02:00 )  Alb: 4.5 g/dL / Pro: 7.8 g/dL / ALK PHOS: 65 U/L / ALT: 35 U/L / AST: 156 U/L / GGT: x           Activated Partial Thromboplastin Time: 30.8 sec (10-05 @ 02:00)      CARDIAC MARKERS:  Creatine Kinase, Serum: 1926 U/L (10-05 @ 02:00)            Lactate, Blood: 2.7 mmol/L (10-05 @ 02:00)  Blood Gas Venous - Lactate: 3.7 mmol/L (10-04 @ 18:46)    10-05 @ 02:00  Cholesterol, Serum - 169  Direct LDL- --  HDL Cholesterol, Serum- 45  Triglycerides, Serum- 327      Thyroid Stimulating Hormone, Serum: 1.70 uIU/mL (10-05 @ 02:00)      Urinalysis Basic - ( 05 Oct 2023 02:00 )    Color: x / Appearance: x / SG: x / pH: x  Gluc: 139 mg/dL / Ketone: x  / Bili: x / Urobili: x   Blood: x / Protein: x / Nitrite: x   Leuk Esterase: x / RBC: x / WBC x   Sq Epi: x / Non Sq Epi: x / Bacteria: x      CAPILLARY BLOOD GLUCOSE            CARDIAC MARKERS ( 05 Oct 2023 02:00 )  x     / x     / 1926 U/L / x     / 190.4 ng/mL        < from: Xray Chest 1 View- PORTABLE-Urgent (10.04.23 @ 19:27) >    INTERPRETATION:  Clear lungs    < end of copied text >             HPI:   52-year-old male with a history of hypertension and hyperlipidemia, 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 He tried water, gas pills without any response. He came to ED around 5 pm. Patient report intermittent mild chest pain for 2 month which resolve after gas pills. Patient states that  today he was just laying in bed when the pain started.  Pain radiates to his back. He reports non adherence to medications   EKG shows ST elevations in inferior leads. S/p one stent to  100% pRCA and 2 stents to  100% mRCA . EDP 19 -  IVF 250cc x1 in cath.  Plan for stage mLAD 70% via Right radial.    Subjective/Objective: Patient alert , oriented . Patient denies  SOB, dizziness, abdomen pain. Report 5/10 chest pain early morning which improve with Tylenol now 2/10      Tele event:  NSR 70-90    MEDICATIONS    aspirin enteric coated 81 milliGRAM(s) Oral daily  ticagrelor 90 milliGRAM(s) Oral every 12 hours  acetaminophen     Tablet .. 650 milliGRAM(s) Oral every 4 hours PRN  pantoprazole    Tablet 40 milliGRAM(s) Oral before breakfast  atorvastatin 80 milliGRAM(s) Oral at bedtime  chlorhexidine 2% Cloths 1 Application(s) Topical <User Schedule>              ICU Vital Signs Last 24 Hrs  T(C): 36.9 (05 Oct 2023 04:00), Max: 36.9 (04 Oct 2023 21:51)  T(F): 98.5 (05 Oct 2023 04:00), Max: 98.5 (05 Oct 2023 04:00)  HR: 71 (05 Oct 2023 07:00) (57 - 76)  BP: 125/82 (05 Oct 2023 07:00) (98/80 - 140/90)  BP(mean): 96 (05 Oct 2023 07:00) (74 - 97)  ABP: --  ABP(mean): --  RR: 23 (05 Oct 2023 07:00) (15 - 26)  SpO2: 96% (05 Oct 2023 07:00) (96% - 100%)    O2 Parameters below as of 05 Oct 2023 07:00  Patient On (Oxygen Delivery Method): room air            PHYSICAL EXAMINATION  Appearance: NAD, no distress  HEENT: Moist Mucous Membranes, Anicteric, PERRL, EOMI  Cardiovascular: Regular rate and rhythm, Normal S1 S2, No JVD, No murmurs  Respiratory: Lungs clear to auscultation. No rales, No rhonchi, No wheezing.   Gastrointestinal:  Soft, Non-tender, + BS  Neurologic: Non-focal, A&Ox3  Skin: Warm and dry, No rashes, No ecchymosis, No cyanosis  Musculoskeletal: No clubbing, No cyanosis, No edema  Vascular: Peripheral pulses palpable 2+ bilaterally  Psychiatry: Mood & affect appropriate      	    		      I&O's Summary    04 Oct 2023 07:01  -  05 Oct 2023 07:00  --------------------------------------------------------  IN: 200 mL / OUT: 1875 mL / NET: -1675 mL    	     LABS:	  LABORATORY VALUES	 	                          15.0   10.36 )-----------( 398      ( 05 Oct 2023 02:00 )             44.2       10-05    137  |  97<L>  |  10  ----------------------------<  139<H>  4.1   |  22  |  0.73  10-04    134<L>  |  97<L>  |  11  ----------------------------<  130<H>  4.2   |  23  |  0.75    Ca    9.8      05 Oct 2023 02:00  Ca    10.5      04 Oct 2023 18:46  Phos  2.9     10-05  Mg     2.30     10-05    TPro  7.8  /  Alb  4.5  /  TBili  0.9  /  DBili  x   /  AST  156<H>  /  ALT  35  /  AlkPhos  65  10-05  TPro  8.3  /  Alb  4.7  /  TBili  0.6  /  DBili  x   /  AST  81<H>  /  ALT  28  /  AlkPhos  68  10-04    LIVER FUNCTIONS - ( 05 Oct 2023 02:00 )  Alb: 4.5 g/dL / Pro: 7.8 g/dL / ALK PHOS: 65 U/L / ALT: 35 U/L / AST: 156 U/L / GGT: x           Activated Partial Thromboplastin Time: 30.8 sec (10-05 @ 02:00)      CARDIAC MARKERS:  Creatine Kinase, Serum: 1926 U/L (10-05 @ 02:00)            Lactate, Blood: 2.7 mmol/L (10-05 @ 02:00)  Blood Gas Venous - Lactate: 3.7 mmol/L (10-04 @ 18:46)    10-05 @ 02:00  Cholesterol, Serum - 169  Direct LDL- --  HDL Cholesterol, Serum- 45  Triglycerides, Serum- 327      Thyroid Stimulating Hormone, Serum: 1.70 uIU/mL (10-05 @ 02:00)      Urinalysis Basic - ( 05 Oct 2023 02:00 )    Color: x / Appearance: x / SG: x / pH: x  Gluc: 139 mg/dL / Ketone: x  / Bili: x / Urobili: x   Blood: x / Protein: x / Nitrite: x   Leuk Esterase: x / RBC: x / WBC x   Sq Epi: x / Non Sq Epi: x / Bacteria: x      CAPILLARY BLOOD GLUCOSE            CARDIAC MARKERS ( 05 Oct 2023 02:00 )  x     / x     / 1926 U/L / x     / 190.4 ng/mL        < from: Xray Chest 1 View- PORTABLE-Urgent (10.04.23 @ 19:27) >    INTERPRETATION:  Clear lungs    < end of copied text >

## 2023-10-05 NOTE — DISCHARGE NOTE PROVIDER - PROVIDER TOKENS
PROVIDER:[TOKEN:[[id number]],FOLLOWUP:[1 week]] PROVIDER:[TOKEN:[[id number]],FOLLOWUP:[1 week]],PROVIDER:[TOKEN:[[id number]],FOLLOWUP:[1 week],ESTABLISHEDPATIENT:[T]] PROVIDER:[TOKEN:[[id number]],SCHEDULEDAPPT:[10/11/2023],SCHEDULEDAPPTTIME:[01:20 PM]],PROVIDER:[TOKEN:[[id number]],FOLLOWUP:[1 week],ESTABLISHEDPATIENT:[T]]

## 2023-10-05 NOTE — DISCHARGE NOTE PROVIDER - CARE PROVIDERS DIRECT ADDRESSES
[email or web address]

## 2023-10-05 NOTE — DISCHARGE NOTE PROVIDER - NSDCACTIVITY_GEN_ALL_CORE
Bathing allowed/Do not drive or operate machinery/Walking - Indoors allowed/No heavy lifting/straining/Walking - Outdoors allowed

## 2023-10-06 ENCOUNTER — TRANSCRIPTION ENCOUNTER (OUTPATIENT)
Age: 53
End: 2023-10-06

## 2023-10-06 VITALS
OXYGEN SATURATION: 97 % | SYSTOLIC BLOOD PRESSURE: 117 MMHG | RESPIRATION RATE: 28 BRPM | DIASTOLIC BLOOD PRESSURE: 85 MMHG | HEART RATE: 76 BPM

## 2023-10-06 PROBLEM — I10 ESSENTIAL (PRIMARY) HYPERTENSION: Chronic | Status: ACTIVE | Noted: 2023-10-04

## 2023-10-06 LAB
ALBUMIN SERPL ELPH-MCNC: 4.2 G/DL — SIGNIFICANT CHANGE UP (ref 3.3–5)
ALP SERPL-CCNC: 55 U/L — SIGNIFICANT CHANGE UP (ref 40–120)
ALT FLD-CCNC: 31 U/L — SIGNIFICANT CHANGE UP (ref 4–41)
ANION GAP SERPL CALC-SCNC: 12 MMOL/L — SIGNIFICANT CHANGE UP (ref 7–14)
APTT BLD: 33 SEC — SIGNIFICANT CHANGE UP (ref 24.5–35.6)
AST SERPL-CCNC: 82 U/L — HIGH (ref 4–40)
BILIRUB SERPL-MCNC: 0.7 MG/DL — SIGNIFICANT CHANGE UP (ref 0.2–1.2)
BUN SERPL-MCNC: 15 MG/DL — SIGNIFICANT CHANGE UP (ref 7–23)
CALCIUM SERPL-MCNC: 10 MG/DL — SIGNIFICANT CHANGE UP (ref 8.4–10.5)
CHLORIDE SERPL-SCNC: 101 MMOL/L — SIGNIFICANT CHANGE UP (ref 98–107)
CK MB BLD-MCNC: 1.8 % — SIGNIFICANT CHANGE UP (ref 0–2.5)
CK MB CFR SERPL CALC: 12.4 NG/ML — HIGH
CK SERPL-CCNC: 681 U/L — HIGH (ref 30–200)
CO2 SERPL-SCNC: 23 MMOL/L — SIGNIFICANT CHANGE UP (ref 22–31)
CREAT SERPL-MCNC: 0.99 MG/DL — SIGNIFICANT CHANGE UP (ref 0.5–1.3)
EGFR: 91 ML/MIN/1.73M2 — SIGNIFICANT CHANGE UP
GLUCOSE SERPL-MCNC: 101 MG/DL — HIGH (ref 70–99)
HCT VFR BLD CALC: 45.4 % — SIGNIFICANT CHANGE UP (ref 39–50)
HGB BLD-MCNC: 15.1 G/DL — SIGNIFICANT CHANGE UP (ref 13–17)
INR BLD: 1.05 RATIO — SIGNIFICANT CHANGE UP (ref 0.85–1.18)
LACTATE SERPL-SCNC: 1.4 MMOL/L — SIGNIFICANT CHANGE UP (ref 0.5–2)
MAGNESIUM SERPL-MCNC: 2.5 MG/DL — SIGNIFICANT CHANGE UP (ref 1.6–2.6)
MCHC RBC-ENTMCNC: 25.5 PG — LOW (ref 27–34)
MCHC RBC-ENTMCNC: 33.3 GM/DL — SIGNIFICANT CHANGE UP (ref 32–36)
MCV RBC AUTO: 76.7 FL — LOW (ref 80–100)
NRBC # BLD: 0 /100 WBCS — SIGNIFICANT CHANGE UP (ref 0–0)
NRBC # FLD: 0 K/UL — SIGNIFICANT CHANGE UP (ref 0–0)
PHOSPHATE SERPL-MCNC: 3.5 MG/DL — SIGNIFICANT CHANGE UP (ref 2.5–4.5)
PLATELET # BLD AUTO: 386 K/UL — SIGNIFICANT CHANGE UP (ref 150–400)
POTASSIUM SERPL-MCNC: 5 MMOL/L — SIGNIFICANT CHANGE UP (ref 3.5–5.3)
POTASSIUM SERPL-SCNC: 5 MMOL/L — SIGNIFICANT CHANGE UP (ref 3.5–5.3)
PROT SERPL-MCNC: 7.9 G/DL — SIGNIFICANT CHANGE UP (ref 6–8.3)
PROTHROM AB SERPL-ACNC: 11.7 SEC — SIGNIFICANT CHANGE UP (ref 9.5–13)
RBC # BLD: 5.92 M/UL — HIGH (ref 4.2–5.8)
RBC # FLD: 15.9 % — HIGH (ref 10.3–14.5)
SODIUM SERPL-SCNC: 136 MMOL/L — SIGNIFICANT CHANGE UP (ref 135–145)
TROPONIN T, HIGH SENSITIVITY RESULT: 2715 NG/L — CRITICAL HIGH
WBC # BLD: 11.2 K/UL — HIGH (ref 3.8–10.5)
WBC # FLD AUTO: 11.2 K/UL — HIGH (ref 3.8–10.5)

## 2023-10-06 PROCEDURE — 99233 SBSQ HOSP IP/OBS HIGH 50: CPT

## 2023-10-06 RX ORDER — METOPROLOL TARTRATE 50 MG
1 TABLET ORAL
Qty: 30 | Refills: 0
Start: 2023-10-06 | End: 2023-11-04

## 2023-10-06 RX ORDER — ATORVASTATIN CALCIUM 80 MG/1
1 TABLET, FILM COATED ORAL
Qty: 30 | Refills: 0
Start: 2023-10-06 | End: 2023-11-04

## 2023-10-06 RX ORDER — LOSARTAN POTASSIUM 100 MG/1
12.5 TABLET, FILM COATED ORAL
Qty: 0 | Refills: 0 | DISCHARGE
Start: 2023-10-06

## 2023-10-06 RX ORDER — METOPROLOL TARTRATE 50 MG
1 TABLET ORAL
Qty: 0 | Refills: 0 | DISCHARGE
Start: 2023-10-06

## 2023-10-06 RX ORDER — ATORVASTATIN CALCIUM 80 MG/1
1 TABLET, FILM COATED ORAL
Refills: 0 | DISCHARGE

## 2023-10-06 RX ORDER — NIACIN 50 MG
1 TABLET ORAL
Refills: 0 | DISCHARGE

## 2023-10-06 RX ORDER — METOPROLOL TARTRATE 50 MG
50 TABLET ORAL DAILY
Refills: 0 | Status: DISCONTINUED | OUTPATIENT
Start: 2023-10-06 | End: 2023-10-06

## 2023-10-06 RX ORDER — METOPROLOL TARTRATE 50 MG
25 TABLET ORAL DAILY
Refills: 0 | Status: DISCONTINUED | OUTPATIENT
Start: 2023-10-06 | End: 2023-10-06

## 2023-10-06 RX ORDER — LOSARTAN POTASSIUM 100 MG/1
0.5 TABLET, FILM COATED ORAL
Qty: 15 | Refills: 0
Start: 2023-10-06 | End: 2023-11-04

## 2023-10-06 RX ORDER — ASPIRIN/CALCIUM CARB/MAGNESIUM 324 MG
1 TABLET ORAL
Qty: 30 | Refills: 0
Start: 2023-10-06 | End: 2023-11-04

## 2023-10-06 RX ORDER — ASPIRIN/CALCIUM CARB/MAGNESIUM 324 MG
1 TABLET ORAL
Qty: 0 | Refills: 0 | DISCHARGE
Start: 2023-10-06

## 2023-10-06 RX ORDER — LOSARTAN POTASSIUM 100 MG/1
12.5 TABLET, FILM COATED ORAL DAILY
Refills: 0 | Status: DISCONTINUED | OUTPATIENT
Start: 2023-10-06 | End: 2023-10-06

## 2023-10-06 RX ORDER — LOSARTAN POTASSIUM 100 MG/1
1 TABLET, FILM COATED ORAL
Refills: 0 | DISCHARGE

## 2023-10-06 RX ORDER — ATORVASTATIN CALCIUM 80 MG/1
1 TABLET, FILM COATED ORAL
Qty: 0 | Refills: 0 | DISCHARGE
Start: 2023-10-06

## 2023-10-06 RX ORDER — TICAGRELOR 90 MG/1
1 TABLET ORAL
Qty: 0 | Refills: 0 | DISCHARGE
Start: 2023-10-06

## 2023-10-06 RX ORDER — TICAGRELOR 90 MG/1
1 TABLET ORAL
Qty: 60 | Refills: 0
Start: 2023-10-06 | End: 2023-11-04

## 2023-10-06 RX ADMIN — TICAGRELOR 90 MILLIGRAM(S): 90 TABLET ORAL at 06:10

## 2023-10-06 RX ADMIN — Medication 50 MILLIGRAM(S): at 11:18

## 2023-10-06 RX ADMIN — LOSARTAN POTASSIUM 12.5 MILLIGRAM(S): 100 TABLET, FILM COATED ORAL at 09:58

## 2023-10-06 RX ADMIN — PANTOPRAZOLE SODIUM 40 MILLIGRAM(S): 20 TABLET, DELAYED RELEASE ORAL at 06:10

## 2023-10-06 RX ADMIN — HEPARIN SODIUM 5000 UNIT(S): 5000 INJECTION INTRAVENOUS; SUBCUTANEOUS at 06:10

## 2023-10-06 RX ADMIN — Medication 81 MILLIGRAM(S): at 11:18

## 2023-10-06 NOTE — DISCHARGE NOTE NURSING/CASE MANAGEMENT/SOCIAL WORK - PATIENT PORTAL LINK FT
You can access the FollowMyHealth Patient Portal offered by [facility] by registering at the following website: [email or web address]. By joining [email or web address] [email or web address] portal, you will also be able to view your health information using other applications (apps) compatible with our system.

## 2023-10-06 NOTE — PROGRESS NOTE ADULT - SUBJECTIVE AND OBJECTIVE BOX
Tele:    Overnight:    MEDICATIONS  (STANDING):  aspirin enteric coated 81 milliGRAM(s) Oral daily  atorvastatin 80 milliGRAM(s) Oral at bedtime  chlorhexidine 2% Cloths 1 Application(s) Topical <User Schedule>  heparin   Injectable 5000 Unit(s) SubCutaneous every 8 hours  metoprolol succinate ER 50 milliGRAM(s) Oral daily  pantoprazole    Tablet 40 milliGRAM(s) Oral before breakfast  sodium chloride 0.9%. 500 milliLiter(s) (100 mL/Hr) IV Continuous <Continuous>  ticagrelor 90 milliGRAM(s) Oral every 12 hours    MEDICATIONS  (PRN):  acetaminophen     Tablet .. 650 milliGRAM(s) Oral every 4 hours PRN Severe Pain (7 - 10)        Vital Signs Last 24 Hrs  T(C): 36.9 (06 Oct 2023 04:00), Max: 37.4 (05 Oct 2023 20:00)  T(F): 98.4 (06 Oct 2023 04:00), Max: 99.3 (05 Oct 2023 20:00)  HR: 79 (06 Oct 2023 07:00) (63 - 110)  BP: 121/91 (06 Oct 2023 07:00) (95/57 - 147/99)  BP(mean): 100 (06 Oct 2023 07:00) (64 - 112)  RR: 23 (06 Oct 2023 07:00) (20 - 28)  SpO2: 94% (06 Oct 2023 07:00) (91% - 99%)    Parameters below as of 06 Oct 2023 07:00  Patient On (Oxygen Delivery Method): room air      I&O's Detail    05 Oct 2023 07:01  -  06 Oct 2023 07:00  --------------------------------------------------------  IN:    Oral Fluid: 360 mL    sodium chloride 0.9%: 500 mL  Total IN: 860 mL    OUT:    Voided (mL): 2050 mL  Total OUT: 2050 mL    Total NET: -1190 mL          Physical Exam:   Constitutional: well appearing  Neck: supple, no JVD  Respiratory: clear breath sounds bilaterally  no use of extra accessory muscles.  Cardiovascular: RRR, normal S1, S2, no gallops, or murmurs.   Gastrointestinal: soft, non-tender, non-distended, normal bowel sounds,  Extremities: no edema, normal ROM, no clubbing, no cyanosis  Vascular: palpable peripheral pulses, no cyanosis,  Neurological: Alert, oriented x3  Skin: no skin lesions, no rash, no ulceration                            15.1   11.20 )-----------( 386      ( 06 Oct 2023 05:55 )             45.4     PT/INR - ( 06 Oct 2023 05:55 )   PT: 11.7 sec;   INR: 1.05 ratio         PTT - ( 06 Oct 2023 05:55 )  PTT:33.0 sec  06 Oct 2023 05:55    136    |  101    |  15     ----------------------------<  101<H>  5.0     |  23     |  0.99   05 Oct 2023 02:00    137    |  97<L>  |  10     ----------------------------<  139<H>  4.1     |  22     |  0.73     Ca    10.0       06 Oct 2023 05:55  Ca    9.8        05 Oct 2023 02:00  Phos  3.5       06 Oct 2023 05:55  Phos  2.9       05 Oct 2023 02:00  Mg     2.50      06 Oct 2023 05:55  Mg     2.30      05 Oct 2023 02:00    TPro  7.9    /  Alb  4.2    /  TBili  0.7    /  DBili  x      /  AST  82<H>  /  ALT  31     /  AlkPhos  55     06 Oct 2023 05:55  TPro  7.8    /  Alb  4.5    /  TBili  0.9    /  DBili  x      /  AST  156<H>  /  ALT  35     /  AlkPhos  65     05 Oct 2023 02:00    CARDIAC MARKERS ( 06 Oct 2023 05:55 )  x     / x     / 681 U/L / x     / 12.4 ng/mL  CARDIAC MARKERS ( 05 Oct 2023 09:00 )  x     / x     / 1741 U/L / x     / 114.7 ng/mL  CARDIAC MARKERS ( 05 Oct 2023 02:00 )  x     / x     / 1926 U/L / x     / 190.4 ng/mL      Creatine Kinase, Serum: 681 U/L (10-06-23 @ 05:55)  Creatine Kinase, Serum: 1741 U/L (10-05-23 @ 09:00)  Creatine Kinase, Serum: 1926 U/L (10-05-23 @ 02:00)    < from: TTE W or WO Ultrasound Enhancing Agent (10.05.23 @ 06:50) >     CONCLUSIONS:      1. Left ventricular cavity is normally sized. Left ventricular wall thickness is normal. Left ventricular systolic function is normal with an ejection fraction of 64 % by 3D. There are no regional wall motion abnormalities seen.   2. Normal left ventricular diastolic function.   3. Normal right ventricular cavity size, wall thickness and systolic function.   4. Normal atria.   5. No significant valvular disease.   6. There is trace tricuspid regurgitation. There is insufficient tricuspid regurgitation detected to calculate pulmonary artery systolic pressure.   7. No pericardial effusion seen.    ________________________________________________________________________________________        < from: Cardiac Catheterization (10.04.23 @ 19:23) >  Coronary Angiography   The coronary circulation is right dominant. Cardiac catheterization  was performed emergently.    LM   Left main artery: Normal.      LAD   Mid left anterior descending: The segment is small to medium sized.  There is a 60 % stenosis in the proximal third portion of  the segment. TESS Flow 3.    Distal left anterior descending: There is a 20 % stenosis in the  proximal third portion of the segment.    CX   Circumflex: Angiography shows minor irregularities.      RCA   Proximal right coronary artery: There is a 60 % stenosis in the middle  third portion of the segment. TESS Flow 3.  Mid right coronary artery: The distal vessel is supplied by limited  collaterals from the Distal circumflex. There is a 100 %  stenosis in the middle third portion of the segment. TESS Flow 0.      Left Heart Cath   Left ventricular function was assessed. Global left ventricular  function is mildly depressed. Ejection fraction was visually  estimated with a value of 40%.        Conclusions:   Occluded mid RCA (culprit). Moderate-severe atherosclerosis in  proximal RCA and mid LAD. Mild-moderate LV systolic  dysfunction. LVEDP 19 mmHg.   Successful primary PCI to mid RCA and proximal RCA with LUANNE for  treatment of inferior STEMI.    Recommendations:   Dual antiplatelet therapy for 12 months. Optimize medical therapy for  ischemic cardiomyopathy. Aggressive risk factor  modification. Consider staged mid LAD PCI prior to discharge.     < end of copied text >                     Tele:  SR with no ectopy    Overnight:  Denies CP, SOB, lightheadedness, Dizziness, palpitations, n/v/d overnight.     MEDICATIONS  (STANDING):  aspirin enteric coated 81 milliGRAM(s) Oral daily  atorvastatin 80 milliGRAM(s) Oral at bedtime  chlorhexidine 2% Cloths 1 Application(s) Topical <User Schedule>  heparin   Injectable 5000 Unit(s) SubCutaneous every 8 hours  metoprolol succinate ER 50 milliGRAM(s) Oral daily  pantoprazole    Tablet 40 milliGRAM(s) Oral before breakfast  sodium chloride 0.9%. 500 milliLiter(s) (100 mL/Hr) IV Continuous <Continuous>  ticagrelor 90 milliGRAM(s) Oral every 12 hours    MEDICATIONS  (PRN):  acetaminophen     Tablet .. 650 milliGRAM(s) Oral every 4 hours PRN Severe Pain (7 - 10)        Vital Signs Last 24 Hrs  T(C): 36.9 (06 Oct 2023 04:00), Max: 37.4 (05 Oct 2023 20:00)  T(F): 98.4 (06 Oct 2023 04:00), Max: 99.3 (05 Oct 2023 20:00)  HR: 79 (06 Oct 2023 07:00) (63 - 110)  BP: 121/91 (06 Oct 2023 07:00) (95/57 - 147/99)  BP(mean): 100 (06 Oct 2023 07:00) (64 - 112)  RR: 23 (06 Oct 2023 07:00) (20 - 28)  SpO2: 94% (06 Oct 2023 07:00) (91% - 99%)    Parameters below as of 06 Oct 2023 07:00  Patient On (Oxygen Delivery Method): room air      I&O's Detail    05 Oct 2023 07:01  -  06 Oct 2023 07:00  --------------------------------------------------------  IN:    Oral Fluid: 360 mL    sodium chloride 0.9%: 500 mL  Total IN: 860 mL    OUT:    Voided (mL): 2050 mL  Total OUT: 2050 mL    Total NET: -1190 mL          Physical Exam:   Constitutional: well appearing  Neck: supple, no JVD  Respiratory: clear breath sounds bilaterally  no use of extra accessory muscles.  Cardiovascular: RRR, normal S1, S2, no gallops, or murmurs.   Gastrointestinal: soft, non-tender, non-distended, normal bowel sounds,  Extremities: no edema, no clubbing, no cyanosis  Vascular: palpable peripheral pulses, no cyanosis,  Neurological: Alert, oriented x3  Skin: no skin lesions, no rash, no ulceration                            15.1   11.20 )-----------( 386      ( 06 Oct 2023 05:55 )             45.4     PT/INR - ( 06 Oct 2023 05:55 )   PT: 11.7 sec;   INR: 1.05 ratio         PTT - ( 06 Oct 2023 05:55 )  PTT:33.0 sec  06 Oct 2023 05:55    136    |  101    |  15     ----------------------------<  101<H>  5.0     |  23     |  0.99   05 Oct 2023 02:00    137    |  97<L>  |  10     ----------------------------<  139<H>  4.1     |  22     |  0.73     Ca    10.0       06 Oct 2023 05:55  Ca    9.8        05 Oct 2023 02:00  Phos  3.5       06 Oct 2023 05:55  Phos  2.9       05 Oct 2023 02:00  Mg     2.50      06 Oct 2023 05:55  Mg     2.30      05 Oct 2023 02:00    TPro  7.9    /  Alb  4.2    /  TBili  0.7    /  DBili  x      /  AST  82<H>  /  ALT  31     /  AlkPhos  55     06 Oct 2023 05:55  TPro  7.8    /  Alb  4.5    /  TBili  0.9    /  DBili  x      /  AST  156<H>  /  ALT  35     /  AlkPhos  65     05 Oct 2023 02:00    CARDIAC MARKERS ( 06 Oct 2023 05:55 )  x     / x     / 681 U/L / x     / 12.4 ng/mL  CARDIAC MARKERS ( 05 Oct 2023 09:00 )  x     / x     / 1741 U/L / x     / 114.7 ng/mL  CARDIAC MARKERS ( 05 Oct 2023 02:00 )  x     / x     / 1926 U/L / x     / 190.4 ng/mL      Creatine Kinase, Serum: 681 U/L (10-06-23 @ 05:55)  Creatine Kinase, Serum: 1741 U/L (10-05-23 @ 09:00)  Creatine Kinase, Serum: 1926 U/L (10-05-23 @ 02:00)    < from: TTE W or WO Ultrasound Enhancing Agent (10.05.23 @ 06:50) >     CONCLUSIONS:      1. Left ventricular cavity is normally sized. Left ventricular wall thickness is normal. Left ventricular systolic function is normal with an ejection fraction of 64 % by 3D. There are no regional wall motion abnormalities seen.   2. Normal left ventricular diastolic function.   3. Normal right ventricular cavity size, wall thickness and systolic function.   4. Normal atria.   5. No significant valvular disease.   6. There is trace tricuspid regurgitation. There is insufficient tricuspid regurgitation detected to calculate pulmonary artery systolic pressure.   7. No pericardial effusion seen.    ________________________________________________________________________________________        < from: Cardiac Catheterization (10.04.23 @ 19:23) >  Coronary Angiography   The coronary circulation is right dominant. Cardiac catheterization  was performed emergently.    LM   Left main artery: Normal.      LAD   Mid left anterior descending: The segment is small to medium sized.  There is a 60 % stenosis in the proximal third portion of  the segment. TESS Flow 3.    Distal left anterior descending: There is a 20 % stenosis in the  proximal third portion of the segment.    CX   Circumflex: Angiography shows minor irregularities.      RCA   Proximal right coronary artery: There is a 60 % stenosis in the middle  third portion of the segment. TESS Flow 3.  Mid right coronary artery: The distal vessel is supplied by limited  collaterals from the Distal circumflex. There is a 100 %  stenosis in the middle third portion of the segment. TESS Flow 0.      Left Heart Cath   Left ventricular function was assessed. Global left ventricular  function is mildly depressed. Ejection fraction was visually  estimated with a value of 40%.        Conclusions:   Occluded mid RCA (culprit). Moderate-severe atherosclerosis in  proximal RCA and mid LAD. Mild-moderate LV systolic  dysfunction. LVEDP 19 mmHg.   Successful primary PCI to mid RCA and proximal RCA with LUANNE for  treatment of inferior STEMI.    Recommendations:   Dual antiplatelet therapy for 12 months. Optimize medical therapy for  ischemic cardiomyopathy. Aggressive risk factor  modification. Consider staged mid LAD PCI prior to discharge.     < end of copied text >

## 2023-10-06 NOTE — PROGRESS NOTE ADULT - ASSESSMENT
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 s/p one stent to  100% pRCA and 2 stents to  100% mRCA . Plan for stage mLAD 70% via Right radial.    Plan     Neuro:   - AxOx3  - No issues       Cardiovascular:  # IWSTEMI  -Patient presented to ER with c/o chest pain. EKG revealed ST elevation inf leads.    -Given active chest pain and TUCKER, s/p urgent cath. /Brilinta 180 mg and heparin loaded in ED  -Cath showed mRCA occlusion DESx2, and pRCA x1 mLAD90% , medical mangement of m LAD  -Assess for resolution of chest pain and for recurrent chest pain  -Serial EKG to assess for resolution of ST changes  -Monitor vital signs to monitor hemodynamic stability  -Continuous cardiac monitoring to monitor for arrhythmias  -Trend cardiac enzymes: 324-> 2081-->2332-->2715, CK/CKMB down trending  -Aspirin 81 PO daily, Brilinta 90 PO BID, Lipitor 80 mg PO daily  -Introduce ACE as tolerated. Hold for now due to recent dye load due to cardiac cath.  -Low fat DASH diet  -ECHO: Left ventricular systolic function is normal with an ejection fraction of 64 % by 3D  unknown etiology of   Lactate 2.7->will give  500cc IVF x1 yesterday. 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 s/p one stent to  100% pRCA and 2 stents to  100% mRCA . Plan for stage mLAD 70% via Right radial.    Plan   Neuro:   - AxOx3  - No issues       Cardiovascular:  # IWSTEMI  -Patient presented to ER with c/o chest pain. EKG revealed ST elevation inf leads.    -Given active chest pain and TUCKER, s/p urgent cath. /Brilinta 180 mg and heparin loaded in ED  -Cath showed mRCA occlusion DESx2, and pRCA x1 mLAD90% , medical mangement of m LAD  -Assess for resolution of chest pain and for recurrent chest pain  -Serial EKG to assess for resolution of ST changes  -Monitor vital signs to monitor hemodynamic stability  -Continuous cardiac monitoring to monitor for arrhythmias  -Trend cardiac enzymes: 324-> 2081-->2332-->2715, CK/CKMB down trending  -Aspirin 81 PO daily, Brilinta 90 PO BID, Lipitor 80 mg PO daily  -Introduce ACE as tolerated. Hold for now due to recent dye load due to cardiac cath.  -Low fat DASH diet  -ECHO: Left ventricular systolic function is normal with an ejection fraction of 64 % by 3D  unknown etiology of   Lactate 2.7->will give  500cc IVF x1 yesterday. lactate this morning 1.4    Respiratory:   - No acute distress         GI:   - Dash diet      :   - scr 0.7 -->0.99 today  - Monitor renal function trend BUN/Creatinine,   - Strict I/O's, daily weights      Endocrine:   -  Ha1c 5.7   TSH 1.7      Heme:   - DVT prophylaxis with heparin SQ      ID: afebrile  Skin: no issues  Lines/Tubes: PIV x 2  Ethics: Full code

## 2023-10-06 NOTE — DISCHARGE NOTE NURSING/CASE MANAGEMENT/SOCIAL WORK - NSDCPEFALRISK_GEN_ALL_CORE
For information on Fall & Injury Prevention, visit: [email or web address] OR  [email or web address] OR  https://www.cdc.gov/steadi/patient.html

## 2023-10-11 ENCOUNTER — APPOINTMENT (OUTPATIENT)
Dept: CARDIOLOGY | Facility: CLINIC | Age: 53
End: 2023-10-11
Payer: MEDICAID

## 2023-10-11 ENCOUNTER — NON-APPOINTMENT (OUTPATIENT)
Age: 53
End: 2023-10-11

## 2023-10-11 VITALS
SYSTOLIC BLOOD PRESSURE: 123 MMHG | OXYGEN SATURATION: 100 % | DIASTOLIC BLOOD PRESSURE: 83 MMHG | HEIGHT: 65 IN | HEART RATE: 96 BPM

## 2023-10-11 DIAGNOSIS — E78.5 HYPERLIPIDEMIA, UNSPECIFIED: ICD-10-CM

## 2023-10-11 DIAGNOSIS — I25.2 OLD MYOCARDIAL INFARCTION: ICD-10-CM

## 2023-10-11 PROBLEM — Z00.00 ENCOUNTER FOR PREVENTIVE HEALTH EXAMINATION: Status: ACTIVE | Noted: 2023-10-11

## 2023-10-11 PROCEDURE — 99214 OFFICE O/P EST MOD 30 MIN: CPT | Mod: 25

## 2023-10-11 PROCEDURE — 93000 ELECTROCARDIOGRAM COMPLETE: CPT

## 2023-10-11 RX ORDER — ATORVASTATIN CALCIUM 80 MG/1
80 TABLET, FILM COATED ORAL
Qty: 90 | Refills: 3 | Status: ACTIVE | COMMUNITY
Start: 1900-01-01 | End: 1900-01-01

## 2023-10-11 NOTE — CHART NOTE - NSCHARTNOTEFT_GEN_A_CORE
Post-Discharge Medication Review    Patient contacted to offer medication counseling post-discharge.     Medication reconciliation completed. Per patient, current medications as of 10/11/23 include:  1. Aspirin Enteric Coated 81 mg oral delayed release tablet: 1 tab(s) orally once a day  2. Atorvastatin 80 mg oral tablet: 1 tab(s) orally once a day (at bedtime)  3. Brilinta (ticagrelor) 90 mg oral tablet: 1 tab(s) orally 2 times a day  4. Losartan 25 mg oral tablet: 0.5 tab(s) orally once a day   5. Toprol-XL 25 mg oral tablet, extended release: 1 tab(s) orally once a day    Medication name, indication, dose, administration, side effects, and monitoring reviewed for new medications post-discharge with patient. Patient demonstrated understanding. Counseling offered for all medications.    -Counseled patient on monitoring for s/s of bleeding and when to seek medical attention as patient is on aspirin and Brilinta.  -Counseled patient on monitoring BP if possible and maintaining log with BP readings he may bring to provider appointments. Patient notes he will obtain BP machine this week.  -Counseled patient on importance of medication adherence.    Patient has follow-up appointment post-discharge scheduled this afternoon 10/11/23 with Cardiology and on 10/25/23 with PCP per patient. Encouraged patient to follow up with providers and keep appointments and to bring updated medication list to all appointments with providers. Post-Discharge Medication Review    Patient contacted to offer medication counseling post-discharge.     Medication reconciliation completed. Per patient, current medications as of 10/11/23 include:  1. Aspirin Enteric Coated 81 mg oral delayed release tablet: 1 tab(s) orally once a day  2. Atorvastatin 80 mg oral tablet: 1 tab(s) orally once a day (at bedtime)  3. Brilinta (ticagrelor) 90 mg oral tablet: 1 tab(s) orally 2 times a day  4. Losartan 25 mg oral tablet: 0.5 tab(s) orally once a day   5. Toprol-XL 25 mg oral tablet, extended release: 1 tab(s) orally once a day    Medication name, indication, dose, administration, side effects, and monitoring reviewed for new medications post-discharge with patient. Patient demonstrated understanding. Counseling offered for all medications.    -Counseled patient on monitoring for s/s of bleeding and when to seek medical attention as patient is on aspirin and Brilinta.  -Counseled patient on monitoring BP if possible and maintaining log with BP readings he may bring to provider appointments. Patient notes he will obtain BP machine this week.  -Counseled patient on importance of medication adherence.    Patient's preferred pharmacy was updated in [facility]: [facility] [id number] [place] [address]    Patient has follow-up appointment post-discharge scheduled this afternoon 10/11/23 with Cardiology and on 10/25/23 with PCP per patient. Encouraged patient to follow up with providers and keep appointments and to bring updated medication list to all appointments with providers.

## 2023-11-15 ENCOUNTER — APPOINTMENT (OUTPATIENT)
Dept: CARDIOLOGY | Facility: CLINIC | Age: 53
End: 2023-11-15
Payer: MEDICAID

## 2023-11-15 ENCOUNTER — NON-APPOINTMENT (OUTPATIENT)
Age: 53
End: 2023-11-15

## 2023-11-15 VITALS
SYSTOLIC BLOOD PRESSURE: 116 MMHG | OXYGEN SATURATION: 99 % | WEIGHT: 152 LBS | DIASTOLIC BLOOD PRESSURE: 83 MMHG | HEIGHT: 65 IN | HEART RATE: 77 BPM | BODY MASS INDEX: 25.33 KG/M2

## 2023-11-15 PROCEDURE — 99214 OFFICE O/P EST MOD 30 MIN: CPT | Mod: 25

## 2023-11-15 PROCEDURE — 93000 ELECTROCARDIOGRAM COMPLETE: CPT

## 2023-12-20 ENCOUNTER — INPATIENT (INPATIENT)
Facility: HOSPITAL | Age: 53
LOS: 0 days | Discharge: HOME | End: 2023-12-21
Attending: INTERNAL MEDICINE | Admitting: INTERNAL MEDICINE
Payer: MEDICAID

## 2023-12-20 VITALS
DIASTOLIC BLOOD PRESSURE: 97 MMHG | HEART RATE: 85 BPM | TEMPERATURE: 98 F | RESPIRATION RATE: 16 BRPM | SYSTOLIC BLOOD PRESSURE: 155 MMHG

## 2023-12-20 DIAGNOSIS — R07.9 CHEST PAIN, UNSPECIFIED: ICD-10-CM

## 2023-12-20 LAB
ALBUMIN SERPL ELPH-MCNC: 4.6 G/DL — SIGNIFICANT CHANGE UP (ref 3.3–5)
ALBUMIN SERPL ELPH-MCNC: 4.6 G/DL — SIGNIFICANT CHANGE UP (ref 3.3–5)
ALP SERPL-CCNC: 71 U/L — SIGNIFICANT CHANGE UP (ref 40–120)
ALP SERPL-CCNC: 71 U/L — SIGNIFICANT CHANGE UP (ref 40–120)
ALT FLD-CCNC: 22 U/L — SIGNIFICANT CHANGE UP (ref 4–41)
ALT FLD-CCNC: 22 U/L — SIGNIFICANT CHANGE UP (ref 4–41)
ANION GAP SERPL CALC-SCNC: 10 MMOL/L — SIGNIFICANT CHANGE UP (ref 7–14)
ANION GAP SERPL CALC-SCNC: 10 MMOL/L — SIGNIFICANT CHANGE UP (ref 7–14)
AST SERPL-CCNC: 14 U/L — SIGNIFICANT CHANGE UP (ref 4–40)
AST SERPL-CCNC: 14 U/L — SIGNIFICANT CHANGE UP (ref 4–40)
BASOPHILS # BLD AUTO: 0.07 K/UL — SIGNIFICANT CHANGE UP (ref 0–0.2)
BASOPHILS # BLD AUTO: 0.07 K/UL — SIGNIFICANT CHANGE UP (ref 0–0.2)
BASOPHILS NFR BLD AUTO: 0.9 % — SIGNIFICANT CHANGE UP (ref 0–2)
BASOPHILS NFR BLD AUTO: 0.9 % — SIGNIFICANT CHANGE UP (ref 0–2)
BILIRUB SERPL-MCNC: 0.4 MG/DL — SIGNIFICANT CHANGE UP (ref 0.2–1.2)
BILIRUB SERPL-MCNC: 0.4 MG/DL — SIGNIFICANT CHANGE UP (ref 0.2–1.2)
BUN SERPL-MCNC: 11 MG/DL — SIGNIFICANT CHANGE UP (ref 7–23)
BUN SERPL-MCNC: 11 MG/DL — SIGNIFICANT CHANGE UP (ref 7–23)
CALCIUM SERPL-MCNC: 10.4 MG/DL — SIGNIFICANT CHANGE UP (ref 8.4–10.5)
CALCIUM SERPL-MCNC: 10.4 MG/DL — SIGNIFICANT CHANGE UP (ref 8.4–10.5)
CHLORIDE SERPL-SCNC: 105 MMOL/L — SIGNIFICANT CHANGE UP (ref 98–107)
CHLORIDE SERPL-SCNC: 105 MMOL/L — SIGNIFICANT CHANGE UP (ref 98–107)
CK MB BLD-MCNC: 1.5 % — SIGNIFICANT CHANGE UP (ref 0–2.5)
CK MB BLD-MCNC: 1.5 % — SIGNIFICANT CHANGE UP (ref 0–2.5)
CK MB CFR SERPL CALC: 1.6 NG/ML — SIGNIFICANT CHANGE UP
CK MB CFR SERPL CALC: 1.6 NG/ML — SIGNIFICANT CHANGE UP
CK SERPL-CCNC: 104 U/L — SIGNIFICANT CHANGE UP (ref 30–200)
CK SERPL-CCNC: 104 U/L — SIGNIFICANT CHANGE UP (ref 30–200)
CO2 SERPL-SCNC: 27 MMOL/L — SIGNIFICANT CHANGE UP (ref 22–31)
CO2 SERPL-SCNC: 27 MMOL/L — SIGNIFICANT CHANGE UP (ref 22–31)
CREAT SERPL-MCNC: 0.81 MG/DL — SIGNIFICANT CHANGE UP (ref 0.5–1.3)
CREAT SERPL-MCNC: 0.81 MG/DL — SIGNIFICANT CHANGE UP (ref 0.5–1.3)
EGFR: 104 ML/MIN/1.73M2 — SIGNIFICANT CHANGE UP
EGFR: 104 ML/MIN/1.73M2 — SIGNIFICANT CHANGE UP
EOSINOPHIL # BLD AUTO: 0.06 K/UL — SIGNIFICANT CHANGE UP (ref 0–0.5)
EOSINOPHIL # BLD AUTO: 0.06 K/UL — SIGNIFICANT CHANGE UP (ref 0–0.5)
EOSINOPHIL NFR BLD AUTO: 0.7 % — SIGNIFICANT CHANGE UP (ref 0–6)
EOSINOPHIL NFR BLD AUTO: 0.7 % — SIGNIFICANT CHANGE UP (ref 0–6)
GLUCOSE SERPL-MCNC: 99 MG/DL — SIGNIFICANT CHANGE UP (ref 70–99)
GLUCOSE SERPL-MCNC: 99 MG/DL — SIGNIFICANT CHANGE UP (ref 70–99)
HCT VFR BLD CALC: 45.3 % — SIGNIFICANT CHANGE UP (ref 39–50)
HCT VFR BLD CALC: 45.3 % — SIGNIFICANT CHANGE UP (ref 39–50)
HGB BLD-MCNC: 14.7 G/DL — SIGNIFICANT CHANGE UP (ref 13–17)
HGB BLD-MCNC: 14.7 G/DL — SIGNIFICANT CHANGE UP (ref 13–17)
IANC: 5.34 K/UL — SIGNIFICANT CHANGE UP (ref 1.8–7.4)
IANC: 5.34 K/UL — SIGNIFICANT CHANGE UP (ref 1.8–7.4)
IMM GRANULOCYTES NFR BLD AUTO: 0.4 % — SIGNIFICANT CHANGE UP (ref 0–0.9)
IMM GRANULOCYTES NFR BLD AUTO: 0.4 % — SIGNIFICANT CHANGE UP (ref 0–0.9)
LIDOCAIN IGE QN: 31 U/L — SIGNIFICANT CHANGE UP (ref 7–60)
LIDOCAIN IGE QN: 31 U/L — SIGNIFICANT CHANGE UP (ref 7–60)
LYMPHOCYTES # BLD AUTO: 2.05 K/UL — SIGNIFICANT CHANGE UP (ref 1–3.3)
LYMPHOCYTES # BLD AUTO: 2.05 K/UL — SIGNIFICANT CHANGE UP (ref 1–3.3)
LYMPHOCYTES # BLD AUTO: 25.5 % — SIGNIFICANT CHANGE UP (ref 13–44)
LYMPHOCYTES # BLD AUTO: 25.5 % — SIGNIFICANT CHANGE UP (ref 13–44)
MCHC RBC-ENTMCNC: 25 PG — LOW (ref 27–34)
MCHC RBC-ENTMCNC: 25 PG — LOW (ref 27–34)
MCHC RBC-ENTMCNC: 32.5 GM/DL — SIGNIFICANT CHANGE UP (ref 32–36)
MCHC RBC-ENTMCNC: 32.5 GM/DL — SIGNIFICANT CHANGE UP (ref 32–36)
MCV RBC AUTO: 77.2 FL — LOW (ref 80–100)
MCV RBC AUTO: 77.2 FL — LOW (ref 80–100)
MONOCYTES # BLD AUTO: 0.49 K/UL — SIGNIFICANT CHANGE UP (ref 0–0.9)
MONOCYTES # BLD AUTO: 0.49 K/UL — SIGNIFICANT CHANGE UP (ref 0–0.9)
MONOCYTES NFR BLD AUTO: 6.1 % — SIGNIFICANT CHANGE UP (ref 2–14)
MONOCYTES NFR BLD AUTO: 6.1 % — SIGNIFICANT CHANGE UP (ref 2–14)
NEUTROPHILS # BLD AUTO: 5.34 K/UL — SIGNIFICANT CHANGE UP (ref 1.8–7.4)
NEUTROPHILS # BLD AUTO: 5.34 K/UL — SIGNIFICANT CHANGE UP (ref 1.8–7.4)
NEUTROPHILS NFR BLD AUTO: 66.4 % — SIGNIFICANT CHANGE UP (ref 43–77)
NEUTROPHILS NFR BLD AUTO: 66.4 % — SIGNIFICANT CHANGE UP (ref 43–77)
NRBC # BLD: 0 /100 WBCS — SIGNIFICANT CHANGE UP (ref 0–0)
NRBC # BLD: 0 /100 WBCS — SIGNIFICANT CHANGE UP (ref 0–0)
NRBC # FLD: 0 K/UL — SIGNIFICANT CHANGE UP (ref 0–0)
NRBC # FLD: 0 K/UL — SIGNIFICANT CHANGE UP (ref 0–0)
PLATELET # BLD AUTO: 441 K/UL — HIGH (ref 150–400)
PLATELET # BLD AUTO: 441 K/UL — HIGH (ref 150–400)
POTASSIUM SERPL-MCNC: 4.9 MMOL/L — SIGNIFICANT CHANGE UP (ref 3.5–5.3)
POTASSIUM SERPL-MCNC: 4.9 MMOL/L — SIGNIFICANT CHANGE UP (ref 3.5–5.3)
POTASSIUM SERPL-SCNC: 4.9 MMOL/L — SIGNIFICANT CHANGE UP (ref 3.5–5.3)
POTASSIUM SERPL-SCNC: 4.9 MMOL/L — SIGNIFICANT CHANGE UP (ref 3.5–5.3)
PROT SERPL-MCNC: 8 G/DL — SIGNIFICANT CHANGE UP (ref 6–8.3)
PROT SERPL-MCNC: 8 G/DL — SIGNIFICANT CHANGE UP (ref 6–8.3)
RBC # BLD: 5.87 M/UL — HIGH (ref 4.2–5.8)
RBC # BLD: 5.87 M/UL — HIGH (ref 4.2–5.8)
RBC # FLD: 16.4 % — HIGH (ref 10.3–14.5)
RBC # FLD: 16.4 % — HIGH (ref 10.3–14.5)
SODIUM SERPL-SCNC: 142 MMOL/L — SIGNIFICANT CHANGE UP (ref 135–145)
SODIUM SERPL-SCNC: 142 MMOL/L — SIGNIFICANT CHANGE UP (ref 135–145)
TROPONIN T, HIGH SENSITIVITY RESULT: 15 NG/L — SIGNIFICANT CHANGE UP
TROPONIN T, HIGH SENSITIVITY RESULT: 15 NG/L — SIGNIFICANT CHANGE UP
TROPONIN T, HIGH SENSITIVITY RESULT: 21 NG/L — SIGNIFICANT CHANGE UP
TROPONIN T, HIGH SENSITIVITY RESULT: 21 NG/L — SIGNIFICANT CHANGE UP
WBC # BLD: 8.04 K/UL — SIGNIFICANT CHANGE UP (ref 3.8–10.5)
WBC # BLD: 8.04 K/UL — SIGNIFICANT CHANGE UP (ref 3.8–10.5)
WBC # FLD AUTO: 8.04 K/UL — SIGNIFICANT CHANGE UP (ref 3.8–10.5)
WBC # FLD AUTO: 8.04 K/UL — SIGNIFICANT CHANGE UP (ref 3.8–10.5)

## 2023-12-20 PROCEDURE — 99285 EMERGENCY DEPT VISIT HI MDM: CPT

## 2023-12-20 PROCEDURE — 99223 1ST HOSP IP/OBS HIGH 75: CPT

## 2023-12-20 PROCEDURE — 71046 X-RAY EXAM CHEST 2 VIEWS: CPT | Mod: 26

## 2023-12-20 RX ORDER — ASPIRIN/CALCIUM CARB/MAGNESIUM 324 MG
81 TABLET ORAL DAILY
Refills: 0 | Status: DISCONTINUED | OUTPATIENT
Start: 2023-12-20 | End: 2023-12-21

## 2023-12-20 RX ORDER — FAMOTIDINE 10 MG/ML
20 INJECTION INTRAVENOUS ONCE
Refills: 0 | Status: COMPLETED | OUTPATIENT
Start: 2023-12-20 | End: 2023-12-20

## 2023-12-20 RX ORDER — TICAGRELOR 90 MG/1
90 TABLET ORAL ONCE
Refills: 0 | Status: COMPLETED | OUTPATIENT
Start: 2023-12-20 | End: 2023-12-20

## 2023-12-20 RX ADMIN — TICAGRELOR 90 MILLIGRAM(S): 90 TABLET ORAL at 21:32

## 2023-12-20 RX ADMIN — Medication 30 MILLILITER(S): at 17:39

## 2023-12-20 RX ADMIN — FAMOTIDINE 20 MILLIGRAM(S): 10 INJECTION INTRAVENOUS at 17:39

## 2023-12-20 RX ADMIN — Medication 81 MILLIGRAM(S): at 21:33

## 2023-12-20 NOTE — ED PROVIDER NOTE - NS ED ATTENDING STATEMENT MOD
This was a shared visit with the JUAN A. I reviewed and verified the documentation and independently performed the documented:

## 2023-12-20 NOTE — H&P ADULT - MUSCULOSKELETAL
no joint warmth/no calf tenderness/strength 5/5 bilateral upper extremities/strength 5/5 bilateral lower extremities details…

## 2023-12-20 NOTE — H&P ADULT - NSHPPHYSICALEXAM_GEN_ALL_CORE
Vital Signs Last 24 Hrs  T(C): 36.5 (20 Dec 2023 23:21), Max: 37.2 (20 Dec 2023 15:33)  T(F): 97.7 (20 Dec 2023 23:21), Max: 99 (20 Dec 2023 15:33)  HR: 60 (20 Dec 2023 23:21) (60 - 85)  BP: 113/80 (20 Dec 2023 23:21) (113/80 - 155/97)  BP(mean): --  RR: 18 (20 Dec 2023 23:21) (16 - 18)  SpO2: 98% (20 Dec 2023 23:21) (98% - 99%)    Parameters below as of 20 Dec 2023 23:21  Patient On (Oxygen Delivery Method): room air

## 2023-12-20 NOTE — ED PROVIDER NOTE - ATTENDING APP SHARED VISIT CONTRIBUTION OF CARE
53M recent CAD s/p stent x 3 p/w 3 weeks of chest pressure, heaviness, "vibrations "rad down L side.  Still having it, worse with eating and sleeping on L side.  Saw cards last month and recc GERD medicine.  Takes ASA and brillinta.  plan check labs, trop, lipase.  TBA due to high risk chest pain, recent stents.  Cards eval.   VS:  unremarkable    GEN - NAD;  malaise;   A+O x3   HEAD - NC/AT     ENT - PEERL, EOMI, mucous membranes    moist , no discharge      NECK: Neck supple, non-tender without lymphadenopathy, no masses, no JVD  PULM - CTA b/l,  symmetric breath sounds.  No chest wall ttp  COR -  normal heart sounds    ABD - , ND, NT, soft,  BACK - no CVA tenderness, nontender spine     EXTREMS - no edema, no deformity, warm and well perfused    SKIN - no rash    or bruising      NEUROLOGIC - alert, face symmetric, speech fluent, sensation nl, motor no focal deficit.

## 2023-12-20 NOTE — H&P ADULT - TIME BILLING
Reviewed admission imaging reports, and admission labs prior to the encounter with  the patient   Reviewed Triage and ED course/ documentation   Reviewed consultants notes, including::: prior STEMI hospital course, cardiology notes October, 2023:  STEMI in October 2023 - s/p stent to  100% pRCA and 2 stents to  100% mRCA 2023; Plan for stage mLAD 70% via Right radial.  Performed full physical exam and ROS  Obtained list of home medications and performed home medications reconciliation on EMR  Discussed prognosis, treatment and its risk and benefits with the patient regarding further ischemic workup  Ordered or reviewed  new admission medications and placed or reviewed all hospitalization admission orders  Managed (continued, held or adjusted doses) home medications   Ordered  or reviewed orders of  new imaging and new lab w/up  Requested new consultations including::: Cardiology

## 2023-12-20 NOTE — ED PROVIDER NOTE - CLINICAL SUMMARY MEDICAL DECISION MAKING FREE TEXT BOX
53 year old male, history of CAD, recent stent placement x 3 in 10/2023 in [facility], complaining of having persistent chest pain x 3 weeks, described as both vibrations, burning, and intermittent heaviness.  Reports pain worsens after eating as well as with sleeping, currently a 5/10 at this time.  States at its worst is a 10/10 which occurred this morning but did not take any other medications today other than his current medication regimen.  Patient followed up with Cardiologist last month due to this complaint, was advised possible gas related pain, no relief with OTC antiacids.  Patient denies fevers, chills, cough, shortness of breath, dizziness, nausea, vomiting, diarrhea, myalgia, dysuria, or any other acute complaints.    Plan: CBC, CMP, Lipase, Troponin  CXR, ECG 53 year old male, history of CAD, recent stent placement x 3 in 10/2023 in [facility], complaining of having persistent chest pain x 3 weeks, described as both vibrations, burning, and intermittent heaviness.  Reports pain worsens after eating as well as with sleeping, currently a 5/10 at this time.  States at its worst is a 10/10 which occurred this morning but did not take any other medications today other than his current medication regimen.  Patient followed up with Cardiologist last month due to this complaint, was advised possible gas related pain, no relief with OTC antiacids.  Patient denies fevers, chills, cough, shortness of breath, dizziness, nausea, vomiting, diarrhea, myalgia, dysuria, or any other acute complaints.    Plan: CBC, CMP, Lipase, Troponin  CXR, ECG 53 year old male, history of CAD, recent stent placement x 3 in 10/2023 in [facility], complaining of having persistent chest pain x 3 weeks, described as both vibrations, burning, and intermittent heaviness.  Reports pain worsens after eating as well as with sleeping, currently a 5/10 at this time.  States at its worst is a 10/10 which occurred this morning but did not take any other medications today other than his current medication regimen.  Patient followed up with Cardiologist last month due to this complaint, was advised possible gas related pain, no relief with OTC antiacids.  Patient denies fevers, chills, cough, shortness of breath, dizziness, nausea, vomiting, diarrhea, myalgia, dysuria, or any other acute complaints.    Plan: CBC, CMP, Lipase, Troponin  CXR, ECG    Reassessment: pt will require admission for chest pain 53 year old male, history of CAD, recent stent placement x 3 in 10/2023 in [facility], complaining of having persistent chest pain x 3 weeks, described as both vibrations, burning, and intermittent heaviness.  Reports pain worsens after eating as well as with sleeping, currently a 5/10 at this time.  States at its worst is a 10/10 which occurred this morning but did not take any other medications today other than his current medication regimen.  Patient followed up with Cardiologist last month due to this complaint, was advised possible gas related pain, no relief with OTC antiacids.  Patient denies fevers, chills, cough, shortness of breath, dizziness, nausea, vomiting, diarrhea, myalgia, dysuria, or any other acute complaints.    Plan: CBC, CMP, Lipase, Troponin  CXR, ECG    Reassessment: pt will require admission for chest pain

## 2023-12-20 NOTE — H&P ADULT - PATIENT'S PREFERRED PRONOUN

## 2023-12-20 NOTE — H&P ADULT - NSHPLABSRESULTS_GEN_ALL_CORE
.    -Personally interpreted EKG: pending       -Personally interpreted CXR: clear lungs, no pleural effusions, no pneumothorax      -Personally reviewed the following labs below:    12-20    142  |  105  |  11  ----------------------------<  99  4.9   |  27  |  0.81    Ca    10.4      20 Dec 2023 17:41    TPro  8.0  /  Alb  4.6  /  TBili  0.4  /  DBili  x   /  AST  14  /  ALT  22  /  AlkPhos  71  12-20                          14.7   8.04  )-----------( 441      ( 20 Dec 2023 17:41 )             45.3         -Personally reviewed: Cardiac cath 10/4/23  Diagnostic Findings:     Coronary Angiography   The coronary circulation is right dominant. Cardiac catheterization  was performed emergently.    LM   Left main artery: Normal.      LAD   Mid left anterior descending: The segment is small to medium sized.  There is a 60 % stenosis in the proximal third portion of  the segment. TESS Flow 3.    Distal left anterior descending: There is a 20 % stenosis in the  proximal third portion of the segment.    CX   Circumflex: Angiography shows minor irregularities.      RCA   Proximal right coronary artery: There is a 60 % stenosis in the middle  third portion of the segment. TESS Flow 3.  Mid right coronary artery: The distal vessel is supplied by limited  collaterals from the Distal circumflex. There is a 100 %  stenosis in the middle third portion of the segment. TESS Flow 0.      Left Heart Cath   Left ventricular function was assessed. Global left ventricular  function is mildly depressed. Ejection fraction was visually  estimated with a value of 40% .    -Personally interpreted EKG: NSR, 83bpm, qtc 460, inferior infarct, no acute Tw or St changes, no PACs or PVCs - unchanged compared to EKG from 10/4/23       -Personally interpreted CXR: clear lungs, no pleural effusions, no pneumothorax      -Personally reviewed the following labs below:    12-20    142  |  105  |  11  ----------------------------<  99  4.9   |  27  |  0.81    Ca    10.4      20 Dec 2023 17:41    TPro  8.0  /  Alb  4.6  /  TBili  0.4  /  DBili  x   /  AST  14  /  ALT  22  /  AlkPhos  71  12-20                          14.7   8.04  )-----------( 441      ( 20 Dec 2023 17:41 )             45.3         -Personally reviewed: Cardiac cath 10/4/23  Diagnostic Findings:     Coronary Angiography   The coronary circulation is right dominant. Cardiac catheterization  was performed emergently.    LM   Left main artery: Normal.      LAD   Mid left anterior descending: The segment is small to medium sized.  There is a 60 % stenosis in the proximal third portion of  the segment. TESS Flow 3.    Distal left anterior descending: There is a 20 % stenosis in the  proximal third portion of the segment.    CX   Circumflex: Angiography shows minor irregularities.      RCA   Proximal right coronary artery: There is a 60 % stenosis in the middle  third portion of the segment. TESS Flow 3.  Mid right coronary artery: The distal vessel is supplied by limited  collaterals from the Distal circumflex. There is a 100 %  stenosis in the middle third portion of the segment. TESS Flow 0.      Left Heart Cath   Left ventricular function was assessed. Global left ventricular  function is mildly depressed. Ejection fraction was visually  estimated with a value of 40%      -Personally reviewed: TTE 10/5/23  CONCLUSIONS:      1. Left ventricular cavity is normally sized. Left ventricular wall thickness is normal. Left ventricular systolic function is normal with an ejection fraction of 64 % by 3D. There are no regional wall motion abnormalities seen.   2. Normal left ventricular diastolic function.   3. Normal right ventricular cavity size, wall thickness and systolic function.   4. Normal atria.   5. No significant valvular disease.   6. There is trace tricuspid regurgitation. There is insufficient tricuspid regurgitation detected to calculate pulmonary artery systolic pressure.   7. No pericardial effusion seen.

## 2023-12-20 NOTE — ED PROVIDER NOTE - PROGRESS NOTE DETAILS
FRANCINE Cedillo: received patient in sign out pending repeat troponin. trop uptrended 15-->21. Pt will require admission at this time due to chest pain in setting of recent stent placement, hospitalist paged. FRANCINE Cedillo: discussed with cardiology fellow, aware of pt and will evaluate.

## 2023-12-20 NOTE — ED ADULT NURSE NOTE - NSFALLUNIVINTERV_ED_ALL_ED
Bed/Stretcher in lowest position, wheels locked, appropriate side rails in place/Call bell, personal items and telephone in reach/Instruct patient to call for assistance before getting out of bed/chair/stretcher/Non-slip footwear applied when patient is off stretcher/[place] to call system/Physically safe environment - no spills, clutter or unnecessary equipment/Purposeful proactive rounding/Room/bathroom lighting operational, light cord in reach Bed/Stretcher in lowest position, wheels locked, appropriate side rails in place/Call bell, personal items and telephone in reach/Instruct patient to call for assistance before getting out of bed/chair/stretcher/Non-slip footwear applied when patient is off stretcher/[place] to call system/Physically safe environment - no spills, clutter or unnecessary equipment/Purposeful proactive rounding/Room/bathroom lighting operational, light cord in reach

## 2023-12-20 NOTE — H&P ADULT - ASSESSMENT
53 year old male, history of CAD, STEMI in October 2023 - s/p stent to  100% pRCA and 2 stents to  100% mRCA 2023 a/w CP

## 2023-12-20 NOTE — ED ADULT NURSE NOTE - OBJECTIVE STATEMENT
Patient presents to ER AOx4 and ambulatory c/o intermittent chest pain and discomfort over the last 3 weeks. He recently had 3 stents placed at [facility] in October. States he feels occasional heaviness and burning in his chest. He states that this morning was the worst pain 10/10 which prompted him to come to ER. Denies SOB, palpitations, nausea, vomiting, diarrhea, fever, chills. Breathing even and unlabored on room air, no cyanosis, pallor, or diaphoresis noted. Labs drawn and sent, IV 20g placed left AC. Medicated as ordered. Patient presents to ER AOx4 and ambulatory c/o intermittent chest pain and discomfort over the last 3 weeks. He recently had 3 stents placed at [facility] in October. States he feels occasional heaviness and burning in his chest. He states that this morning was the worst pain 10/10 which prompted him to come to ER. Denies SOB, palpitations, nausea, vomiting, diarrhea, fever, chills. Breathing even and unlabored on room air, no cyanosis, pallor, or diaphoresis noted. Labs drawn and sent, IV 20g placed left AC. Medicated as ordered.

## 2023-12-20 NOTE — ED PROVIDER NOTE - OBJECTIVE STATEMENT
53 year old male, history of CAD, recent stent placement x 3 in 10/2023 in [facility], complaining of having persistent chest pain x 3 weeks, described as both vibrations, burning, and intermittent heaviness.  Reports pain worsens after eating as well as with sleeping, currently a 5/10 at this time.  States at its worst is a 10/10 which occurred this morning but did not take any other medications today other than his current medication regimen.  Patient followed up with Cardiologist last month due to this complaint, was advised possible gas related pain, no relief with OTC antiacids.  Patient denies fevers, chills, cough, shortness of breath, dizziness, nausea, vomiting, diarrhea, myalgia, dysuria, or any other acute complaints. 53 year old male, history of CAD, recent stent placement x 3 in 10/2023 in [facility], complaining of having persistent chest pain x 3 weeks, described as both vibrations, burning, and intermittent heaviness.  Reports pain worsens after eating as well as with sleeping, currently a 5/10 at this time.  States at its worst is a 10/10 which occurred this morning but did not take any other medications today other than his current medication regimen.  Patient followed up with Cardiologist last month due to this complaint, was advised possible gas related pain, no relief with OTC antiacids.  Patient denies fevers, chills, cough, shortness of breath, dizziness, nausea, vomiting, diarrhea, myalgia, dysuria, or any other acute complaints.

## 2023-12-20 NOTE — H&P ADULT - PROBLEM SELECTOR PLAN 1
EKG: no acute ischemic changes, unchanged compared to EKG from 10/4/23  Reviewed October Cardiac cath and TTE  CXR: clear lungs     -telemetry   - EKG: no acute ischemic changes, unchanged compared to EKG from 10/4/23  Reviewed October Cardiac cath and TTE  CXR: clear lungs   TESS score= 4pts, 20% risk   Per Oct 6, 2023 Cardiology note: Plan for stage mLAD 70% via Right radial.  High risk. likely cardiac CP, recent h/o STEMI as above        -Cardiology c/s pending  -Telemetry   -Deferring repeat TTE to cardiology   -c/w Brilinta BID and ASA  -c/w BB, Statin  -EKG, Trop, CKMB,CK prn CP EKG: no acute ischemic changes, unchanged compared to EKG from 10/4/23  Reviewed October Cardiac cath and TTE  CXR: clear lungs   TESS score= 4pts, 20% risk   Per Oct 6, 2023 Cardiology note: Plan for stage mLAD 70% via Right radial.  High risk. likely cardiac CP, recent h/o STEMI as above        -Cardiology c/s pending  -Telemetry   -Deferring repeat TTE to cardiology   -c/w Brilinta BID and ASA  -c/w BB, Statin  -EKG, Trop, CKMB,CK prn CP  -f/u Trop, ProBNP in AM

## 2023-12-20 NOTE — ED PROVIDER NOTE - NSICDXPASTMEDICALHX_GEN_ALL_CORE_FT
PAST MEDICAL HISTORY:  CAD (coronary artery disease)     Hypertension     STEMI (ST elevation myocardial infarction)

## 2023-12-20 NOTE — ED PROVIDER NOTE - CARDIAC, MLM
Normal rate, regular rhythm.  Heart sounds S1, S2.  No murmurs, rubs or gallops.  Chest wall nontender

## 2023-12-20 NOTE — H&P ADULT - HISTORY OF PRESENT ILLNESS
53 year old male, history of CAD, STEMI in October 2023 - s/p stent to  100% pRCA and 2 stents to  100% mRCA 2023. Pt c/o of having persistent chest pain x 3 weeks, described as both vibrations, burning, and intermittent heaviness.  Reports pain worsens after eating as well as with sleeping, currently a 5/10 at this time.  States at its worst is a 10/10 which occurred this morning but did not take any other medications today other than his current medication regimen.  Patient followed up with Cardiologist last month due to this complaint, was advised possible gas related pain, no relief with OTC antiacids.  Patient reports no fever, chills, cough, shortness of breath, dizziness, nausea, vomiting, diarrhea, myalgia, dysuria.

## 2023-12-20 NOTE — ED ADULT TRIAGE NOTE - CHIEF COMPLAINT QUOTE
pt c/o left side/ chest pain x 3 weeks intermittent 8/10 at times. denies dizziness, lightheadedness. past med hx CAD with stents on Brilinta,

## 2023-12-20 NOTE — H&P ADULT - SKIN/BREAST
Mildred Floyd  [address] [facility] [address]      May 15, 2021      : 10/21/1935    Dear Ms. Floyd:    We have been trying to reach you without success.  Please call my office at [phone].    Thank you for your timely response.    Sincerely,       From the office of  Pool Wren MD        
details…

## 2023-12-21 ENCOUNTER — TRANSCRIPTION ENCOUNTER (OUTPATIENT)
Age: 53
End: 2023-12-21

## 2023-12-21 VITALS
HEART RATE: 74 BPM | SYSTOLIC BLOOD PRESSURE: 124 MMHG | OXYGEN SATURATION: 100 % | DIASTOLIC BLOOD PRESSURE: 80 MMHG | TEMPERATURE: 98 F | RESPIRATION RATE: 17 BRPM

## 2023-12-21 DIAGNOSIS — I25.10 ATHEROSCLEROTIC HEART DISEASE OF NATIVE CORONARY ARTERY WITHOUT ANGINA PECTORIS: ICD-10-CM

## 2023-12-21 DIAGNOSIS — Z29.9 ENCOUNTER FOR PROPHYLACTIC MEASURES, UNSPECIFIED: ICD-10-CM

## 2023-12-21 DIAGNOSIS — R07.9 CHEST PAIN, UNSPECIFIED: ICD-10-CM

## 2023-12-21 LAB
ANION GAP SERPL CALC-SCNC: 11 MMOL/L — SIGNIFICANT CHANGE UP (ref 7–14)
ANION GAP SERPL CALC-SCNC: 11 MMOL/L — SIGNIFICANT CHANGE UP (ref 7–14)
BASOPHILS # BLD AUTO: 0.09 K/UL — SIGNIFICANT CHANGE UP (ref 0–0.2)
BASOPHILS # BLD AUTO: 0.09 K/UL — SIGNIFICANT CHANGE UP (ref 0–0.2)
BASOPHILS NFR BLD AUTO: 1 % — SIGNIFICANT CHANGE UP (ref 0–2)
BASOPHILS NFR BLD AUTO: 1 % — SIGNIFICANT CHANGE UP (ref 0–2)
BUN SERPL-MCNC: 11 MG/DL — SIGNIFICANT CHANGE UP (ref 7–23)
BUN SERPL-MCNC: 11 MG/DL — SIGNIFICANT CHANGE UP (ref 7–23)
CALCIUM SERPL-MCNC: 10.1 MG/DL — SIGNIFICANT CHANGE UP (ref 8.4–10.5)
CALCIUM SERPL-MCNC: 10.1 MG/DL — SIGNIFICANT CHANGE UP (ref 8.4–10.5)
CHLORIDE SERPL-SCNC: 102 MMOL/L — SIGNIFICANT CHANGE UP (ref 98–107)
CHLORIDE SERPL-SCNC: 102 MMOL/L — SIGNIFICANT CHANGE UP (ref 98–107)
CO2 SERPL-SCNC: 27 MMOL/L — SIGNIFICANT CHANGE UP (ref 22–31)
CO2 SERPL-SCNC: 27 MMOL/L — SIGNIFICANT CHANGE UP (ref 22–31)
CREAT SERPL-MCNC: 0.87 MG/DL — SIGNIFICANT CHANGE UP (ref 0.5–1.3)
CREAT SERPL-MCNC: 0.87 MG/DL — SIGNIFICANT CHANGE UP (ref 0.5–1.3)
EGFR: 102 ML/MIN/1.73M2 — SIGNIFICANT CHANGE UP
EGFR: 102 ML/MIN/1.73M2 — SIGNIFICANT CHANGE UP
EOSINOPHIL # BLD AUTO: 0.16 K/UL — SIGNIFICANT CHANGE UP (ref 0–0.5)
EOSINOPHIL # BLD AUTO: 0.16 K/UL — SIGNIFICANT CHANGE UP (ref 0–0.5)
EOSINOPHIL NFR BLD AUTO: 1.8 % — SIGNIFICANT CHANGE UP (ref 0–6)
EOSINOPHIL NFR BLD AUTO: 1.8 % — SIGNIFICANT CHANGE UP (ref 0–6)
GLUCOSE SERPL-MCNC: 108 MG/DL — HIGH (ref 70–99)
GLUCOSE SERPL-MCNC: 108 MG/DL — HIGH (ref 70–99)
HCT VFR BLD CALC: 46.2 % — SIGNIFICANT CHANGE UP (ref 39–50)
HCT VFR BLD CALC: 46.2 % — SIGNIFICANT CHANGE UP (ref 39–50)
HGB BLD-MCNC: 15 G/DL — SIGNIFICANT CHANGE UP (ref 13–17)
HGB BLD-MCNC: 15 G/DL — SIGNIFICANT CHANGE UP (ref 13–17)
IANC: 4.95 K/UL — SIGNIFICANT CHANGE UP (ref 1.8–7.4)
IANC: 4.95 K/UL — SIGNIFICANT CHANGE UP (ref 1.8–7.4)
IMM GRANULOCYTES NFR BLD AUTO: 0.3 % — SIGNIFICANT CHANGE UP (ref 0–0.9)
IMM GRANULOCYTES NFR BLD AUTO: 0.3 % — SIGNIFICANT CHANGE UP (ref 0–0.9)
LYMPHOCYTES # BLD AUTO: 2.59 K/UL — SIGNIFICANT CHANGE UP (ref 1–3.3)
LYMPHOCYTES # BLD AUTO: 2.59 K/UL — SIGNIFICANT CHANGE UP (ref 1–3.3)
LYMPHOCYTES # BLD AUTO: 29.9 % — SIGNIFICANT CHANGE UP (ref 13–44)
LYMPHOCYTES # BLD AUTO: 29.9 % — SIGNIFICANT CHANGE UP (ref 13–44)
MAGNESIUM SERPL-MCNC: 2.6 MG/DL — SIGNIFICANT CHANGE UP (ref 1.6–2.6)
MAGNESIUM SERPL-MCNC: 2.6 MG/DL — SIGNIFICANT CHANGE UP (ref 1.6–2.6)
MCHC RBC-ENTMCNC: 25.1 PG — LOW (ref 27–34)
MCHC RBC-ENTMCNC: 25.1 PG — LOW (ref 27–34)
MCHC RBC-ENTMCNC: 32.5 GM/DL — SIGNIFICANT CHANGE UP (ref 32–36)
MCHC RBC-ENTMCNC: 32.5 GM/DL — SIGNIFICANT CHANGE UP (ref 32–36)
MCV RBC AUTO: 77.4 FL — LOW (ref 80–100)
MCV RBC AUTO: 77.4 FL — LOW (ref 80–100)
MONOCYTES # BLD AUTO: 0.83 K/UL — SIGNIFICANT CHANGE UP (ref 0–0.9)
MONOCYTES # BLD AUTO: 0.83 K/UL — SIGNIFICANT CHANGE UP (ref 0–0.9)
MONOCYTES NFR BLD AUTO: 9.6 % — SIGNIFICANT CHANGE UP (ref 2–14)
MONOCYTES NFR BLD AUTO: 9.6 % — SIGNIFICANT CHANGE UP (ref 2–14)
NEUTROPHILS # BLD AUTO: 4.95 K/UL — SIGNIFICANT CHANGE UP (ref 1.8–7.4)
NEUTROPHILS # BLD AUTO: 4.95 K/UL — SIGNIFICANT CHANGE UP (ref 1.8–7.4)
NEUTROPHILS NFR BLD AUTO: 57.4 % — SIGNIFICANT CHANGE UP (ref 43–77)
NEUTROPHILS NFR BLD AUTO: 57.4 % — SIGNIFICANT CHANGE UP (ref 43–77)
NRBC # BLD: 0 /100 WBCS — SIGNIFICANT CHANGE UP (ref 0–0)
NRBC # BLD: 0 /100 WBCS — SIGNIFICANT CHANGE UP (ref 0–0)
NRBC # FLD: 0 K/UL — SIGNIFICANT CHANGE UP (ref 0–0)
NRBC # FLD: 0 K/UL — SIGNIFICANT CHANGE UP (ref 0–0)
NT-PROBNP SERPL-SCNC: 67 PG/ML — SIGNIFICANT CHANGE UP
NT-PROBNP SERPL-SCNC: 67 PG/ML — SIGNIFICANT CHANGE UP
PHOSPHATE SERPL-MCNC: 3.6 MG/DL — SIGNIFICANT CHANGE UP (ref 2.5–4.5)
PHOSPHATE SERPL-MCNC: 3.6 MG/DL — SIGNIFICANT CHANGE UP (ref 2.5–4.5)
PLATELET # BLD AUTO: 452 K/UL — HIGH (ref 150–400)
PLATELET # BLD AUTO: 452 K/UL — HIGH (ref 150–400)
POTASSIUM SERPL-MCNC: 4.5 MMOL/L — SIGNIFICANT CHANGE UP (ref 3.5–5.3)
POTASSIUM SERPL-MCNC: 4.5 MMOL/L — SIGNIFICANT CHANGE UP (ref 3.5–5.3)
POTASSIUM SERPL-SCNC: 4.5 MMOL/L — SIGNIFICANT CHANGE UP (ref 3.5–5.3)
POTASSIUM SERPL-SCNC: 4.5 MMOL/L — SIGNIFICANT CHANGE UP (ref 3.5–5.3)
RBC # BLD: 5.97 M/UL — HIGH (ref 4.2–5.8)
RBC # BLD: 5.97 M/UL — HIGH (ref 4.2–5.8)
RBC # FLD: 16.7 % — HIGH (ref 10.3–14.5)
RBC # FLD: 16.7 % — HIGH (ref 10.3–14.5)
SODIUM SERPL-SCNC: 140 MMOL/L — SIGNIFICANT CHANGE UP (ref 135–145)
SODIUM SERPL-SCNC: 140 MMOL/L — SIGNIFICANT CHANGE UP (ref 135–145)
TROPONIN T, HIGH SENSITIVITY RESULT: 18 NG/L — SIGNIFICANT CHANGE UP
TROPONIN T, HIGH SENSITIVITY RESULT: 18 NG/L — SIGNIFICANT CHANGE UP
WBC # BLD: 8.65 K/UL — SIGNIFICANT CHANGE UP (ref 3.8–10.5)
WBC # BLD: 8.65 K/UL — SIGNIFICANT CHANGE UP (ref 3.8–10.5)
WBC # FLD AUTO: 8.65 K/UL — SIGNIFICANT CHANGE UP (ref 3.8–10.5)
WBC # FLD AUTO: 8.65 K/UL — SIGNIFICANT CHANGE UP (ref 3.8–10.5)

## 2023-12-21 PROCEDURE — 99238 HOSP IP/OBS DSCHRG MGMT 30/<: CPT

## 2023-12-21 PROCEDURE — 99223 1ST HOSP IP/OBS HIGH 75: CPT

## 2023-12-21 RX ORDER — LOSARTAN POTASSIUM 100 MG/1
12.5 TABLET, FILM COATED ORAL DAILY
Refills: 0 | Status: DISCONTINUED | OUTPATIENT
Start: 2023-12-21 | End: 2023-12-21

## 2023-12-21 RX ORDER — PANTOPRAZOLE SODIUM 20 MG/1
1 TABLET, DELAYED RELEASE ORAL
Qty: 14 | Refills: 0
Start: 2023-12-21 | End: 2024-01-03

## 2023-12-21 RX ORDER — ACETAMINOPHEN 500 MG
650 TABLET ORAL EVERY 6 HOURS
Refills: 0 | Status: DISCONTINUED | OUTPATIENT
Start: 2023-12-21 | End: 2023-12-21

## 2023-12-21 RX ORDER — ENOXAPARIN SODIUM 100 MG/ML
40 INJECTION SUBCUTANEOUS EVERY 24 HOURS
Refills: 0 | Status: DISCONTINUED | OUTPATIENT
Start: 2023-12-21 | End: 2023-12-21

## 2023-12-21 RX ORDER — ONDANSETRON 8 MG/1
4 TABLET, FILM COATED ORAL EVERY 8 HOURS
Refills: 0 | Status: DISCONTINUED | OUTPATIENT
Start: 2023-12-21 | End: 2023-12-21

## 2023-12-21 RX ORDER — ASPIRIN/CALCIUM CARB/MAGNESIUM 324 MG
81 TABLET ORAL DAILY
Refills: 0 | Status: DISCONTINUED | OUTPATIENT
Start: 2023-12-21 | End: 2023-12-21

## 2023-12-21 RX ORDER — PANTOPRAZOLE SODIUM 20 MG/1
40 TABLET, DELAYED RELEASE ORAL
Refills: 0 | Status: DISCONTINUED | OUTPATIENT
Start: 2023-12-21 | End: 2023-12-21

## 2023-12-21 RX ORDER — LANOLIN ALCOHOL/MO/W.PET/CERES
3 CREAM (GRAM) TOPICAL AT BEDTIME
Refills: 0 | Status: DISCONTINUED | OUTPATIENT
Start: 2023-12-21 | End: 2023-12-21

## 2023-12-21 RX ORDER — TICAGRELOR 90 MG/1
90 TABLET ORAL EVERY 12 HOURS
Refills: 0 | Status: DISCONTINUED | OUTPATIENT
Start: 2023-12-21 | End: 2023-12-21

## 2023-12-21 RX ORDER — METOPROLOL TARTRATE 50 MG
25 TABLET ORAL DAILY
Refills: 0 | Status: DISCONTINUED | OUTPATIENT
Start: 2023-12-21 | End: 2023-12-21

## 2023-12-21 RX ORDER — ATORVASTATIN CALCIUM 80 MG/1
80 TABLET, FILM COATED ORAL AT BEDTIME
Refills: 0 | Status: DISCONTINUED | OUTPATIENT
Start: 2023-12-21 | End: 2023-12-21

## 2023-12-21 RX ADMIN — PANTOPRAZOLE SODIUM 40 MILLIGRAM(S): 20 TABLET, DELAYED RELEASE ORAL at 12:31

## 2023-12-21 RX ADMIN — TICAGRELOR 90 MILLIGRAM(S): 90 TABLET ORAL at 06:03

## 2023-12-21 RX ADMIN — Medication 25 MILLIGRAM(S): at 06:03

## 2023-12-21 RX ADMIN — LOSARTAN POTASSIUM 12.5 MILLIGRAM(S): 100 TABLET, FILM COATED ORAL at 06:03

## 2023-12-21 RX ADMIN — ENOXAPARIN SODIUM 40 MILLIGRAM(S): 100 INJECTION SUBCUTANEOUS at 06:03

## 2023-12-21 NOTE — CONSULT NOTE ADULT - SUBJECTIVE AND OBJECTIVE BOX
CHIEF COMPLAINT: Chest pain    HISTORY OF PRESENT ILLNESS:  Patient is a 53 year old male with PMHx CAD, IWSTEMI in October 2023 - s/p stent to  100% pRCA and 2 stents to  100% mRCA 2023. Cardiology consulted for concern for chest pain in setting of recent ACS. On interview, patient with chief complaint of persistent chest pain x 3 weeks, described as both vibrations, burning, and intermittent heaviness.  Reports pain worsens after eating as well as with sleeping, currently a 5/10 at this time.  States at its worst is a 10/10 which occurred this morning but did not take any other medications today other than his current medication regimen.  Patient states he had follow up appoint with interventionalist Dr. Burrows last month due to this complaint, was advised possible gas related pain, no relief with OTC antiacids. Patient states he experiences no relief with seltzer water. Patient reports no fever, chills, cough, shortness of breath, dizziness, nausea, vomiting, diarrhea, myalgia, dysuria.    Allergies  No Known Allergies    	    MEDICATIONS:  aspirin enteric coated 81 milliGRAM(s) Oral daily  enoxaparin Injectable 40 milliGRAM(s) SubCutaneous every 24 hours  losartan 12.5 milliGRAM(s) Oral daily  metoprolol succinate ER 25 milliGRAM(s) Oral daily  ticagrelor 90 milliGRAM(s) Oral every 12 hours        acetaminophen     Tablet .. 650 milliGRAM(s) Oral every 6 hours PRN  melatonin 3 milliGRAM(s) Oral at bedtime PRN  ondansetron Injectable 4 milliGRAM(s) IV Push every 8 hours PRN    aluminum hydroxide/magnesium hydroxide/simethicone Suspension 30 milliLiter(s) Oral every 4 hours PRN    atorvastatin 80 milliGRAM(s) Oral at bedtime        PAST MEDICAL & SURGICAL HISTORY:  Hypertension      CAD (coronary artery disease)      STEMI (ST elevation myocardial infarction)      No significant past surgical history          FAMILY HISTORY:  Family history of heart attack (Father)        SOCIAL HISTORY:    [ ] Non-smoker  [ ] Smoker  [ ] Alcohol  [ ] Denies Alcohol      REVIEW OF SYSTEMS:  CONSTITUTIONAL: no fevers or chills  EYES/ENT: No visual changes; No vertigo or throat pain  NECK: No JVD  RESPIRATORY:  No cough, wheezing, chills or hemoptysis, SOB  CARDIOVASCULAR: No chest pain, palpitations, passing out, dizziness, or leg swelling  GASTROINTESTINAL: +Epigastric pain. No nausea/vomiting/melena  Genitourinary: No dysuria, frequency or hematuria  NEUROLOGICAL: No numbness or weakness  SKIN: No itching, burning, rashes or lesions      PHYSICAL EXAM:  T(C): 36.6 (12-21-23 @ 00:51), Max: 37.2 (12-20-23 @ 15:33)  HR: 61 (12-21-23 @ 00:51) (60 - 85)  BP: 140/88 (12-21-23 @ 00:51) (113/80 - 155/97)  RR: 16 (12-21-23 @ 00:51) (16 - 18)  SpO2: 100% (12-21-23 @ 00:51) (98% - 100%)  Wt(kg): --  ICU Vital Signs Last 24 Hrs  T(C): 36.6 (21 Dec 2023 00:51), Max: 37.2 (20 Dec 2023 15:33)  T(F): 97.8 (21 Dec 2023 00:51), Max: 99 (20 Dec 2023 15:33)  HR: 61 (21 Dec 2023 00:51) (60 - 85)  BP: 140/88 (21 Dec 2023 00:51) (113/80 - 155/97)  BP(mean): --  ABP: --  ABP(mean): --  RR: 16 (21 Dec 2023 00:51) (16 - 18)  SpO2: 100% (21 Dec 2023 00:51) (98% - 100%)    O2 Parameters below as of 21 Dec 2023 00:51  Patient On (Oxygen Delivery Method): room air          I&O's Summary        Appearance: Normal	  HEENT:   Normal oral mucosa	  Cardiovascular: Normal S1 S2, No JVD, No murmurs, No edema  Respiratory: Lungs clear to auscultation	  Psychiatry: A & O x 3, Mood & affect appropriate  Gastrointestinal:  Soft, Non-tender, + BS	  Skin: No rashes, No ecchymoses, No cyanosis	  Neurologic: Non-focal  Extremities: Warm and well perfused. 2+ pulses bilaterally        LABS:	 	    CBC Full  -  ( 20 Dec 2023 17:41 )  WBC Count : 8.04 K/uL  Hemoglobin : 14.7 g/dL  Hematocrit : 45.3 %  Platelet Count - Automated : 441 K/uL  Mean Cell Volume : 77.2 fL  Mean Cell Hemoglobin : 25.0 pg  Mean Cell Hemoglobin Concentration : 32.5 gm/dL  Auto Neutrophil # : 5.34 K/uL  Auto Lymphocyte # : 2.05 K/uL  Auto Monocyte # : 0.49 K/uL  Auto Eosinophil # : 0.06 K/uL  Auto Basophil # : 0.07 K/uL  Auto Neutrophil % : 66.4 %  Auto Lymphocyte % : 25.5 %  Auto Monocyte % : 6.1 %  Auto Eosinophil % : 0.7 %  Auto Basophil % : 0.9 %    12-20    142  |  105  |  11  ----------------------------<  99  4.9   |  27  |  0.81    Ca    10.4      20 Dec 2023 17:41    TPro  8.0  /  Alb  4.6  /  TBili  0.4  /  DBili  x   /  AST  14  /  ALT  22  /  AlkPhos  71  12-20      proBNP: 67  Lipid Profile:   HgA1c:   TSH:     CARDIAC MARKERS:  Troponin 15-> 21  CKMB 1.6  CPK 1.5          Creatine Kinase, Serum: 104 U/L (12-20-23 @ 19:14)      TELEMETRY: NSR	    ECG: NSR without acute ischemic changes 	    PREVIOUS DIAGNOSTIC TESTING:    [ x ] Echocardiogram:  < from: TTE W or WO Ultrasound Enhancing Agent (10.05.23 @ 06:50) >   1. Left ventricular cavity is normally sized. Left ventricular wall thickness is normal. Left ventricular systolic function is normal with an ejection fraction of 64 % by 3D. There are no regional wall motion abnormalities seen.   2. Normal left ventricular diastolic function.   3. Normal right ventricular cavity size, wall thickness and systolic function.   4. Normal atria.   5. No significant valvular disease.   6. There is trace tricuspid regurgitation. There is insufficient tricuspid regurgitation detected to calculate pulmonary artery systolic pressure.   7. No pericardial effusion seen.    < end of copied text >    [ x ]  Catheterization:  < from: Cardiac Catheterization (10.04.23 @ 19:23) >  Indications:                Myocardial infarction with ST elevation  (STEMI)  PCI Status:         emergent     Conclusions:   Occluded mid RCA (culprit). Moderate-severe atherosclerosis in  proximal RCA and mid LAD. Mild-moderate LV systolic  dysfunction. LVEDP 19 mmHg.   Successful primary PCI to mid RCA and proximal RCA with LUANNE for  treatment of inferior STEMI.    Recommendations:   Dual antiplatelet therapy for 12 months. Optimize medical therapy for  ischemic cardiomyopathy. Aggressive risk factor  modification. Consider staged mid LAD PCI prior to discharge.     < end of copied text >    [ ] Stress Test:

## 2023-12-21 NOTE — ED ADULT NURSE REASSESSMENT NOTE - NS ED NURSE REASSESS COMMENT FT1
Report given to Anne Marie SHORT, ESSU 3. Patient resting comfortably in stretcher, breathing even and unlabored. Offers no complaints at this time. Instructed to call for assistance.

## 2023-12-21 NOTE — CONSULT NOTE ADULT - ASSESSMENT
Patient is a 53 year old male with PMHx CAD, IWSTEMI in October 2023 - s/p stent to  100% pRCA and 2 stents to  100% mRCA 2023. Cardiology consulted for concern for chest pain in setting of recent ACS. Patient with unremarkable physical exam, negative cardiac enzymes and EKG without acute ischemic changes. Despite recent ACS,   Patient is a 53 year old male with PMHx CAD, IWSTEMI in October 2023 - s/p stent to  100% pRCA and 2 stents to  100% mRCA 2023. Cardiology consulted for concern for chest pain in setting of recent ACS. Patient with unremarkable physical exam, negative cardiac enzymes and EKG without acute ischemic changes. Despite recent ACS,  patient clinical presentation likely attributable to GERD versus ACS.    PLAN:  #Chest pain  - EKG without acute ischemic changes and pt HD stable  - Patient describes burning sensation in chest, worse with eating and laying down and ongoing for weeks  - Troponins negative and remainder of cardiac enzymes negative  - Would not treat as ACS  - No need for repeat TTE as most recent in October 2023 with normal EF and no wall motion abnormalities  - Continue care per primary medicine team    Case discussed with cardiology fellow Reji Zimmerman MD  Should patient HD status change, please call cardiology at #[phone] once again.  Attending attestation to follow for final recommendations Patient is a 53 year old male with PMHx CAD, IWSTEMI in October 2023 - s/p stent to  100% pRCA and 2 stents to  100% mRCA 2023. Cardiology consulted for concern for chest pain in setting of recent ACS. Patient with unremarkable physical exam, negative cardiac enzymes and EKG without acute ischemic changes. Despite recent ACS,  patient clinical presentation likely attributable to GERD versus ACS.    PLAN:  #Chest pain  - EKG without acute ischemic changes and pt HD stable  - Patient describes burning sensation in chest, worse with eating and laying down and ongoing for weeks  - Troponins negative and remainder of cardiac enzymes negative  - Would not treat as ACS  - No need for repeat TTE as most recent in October 2023 with normal EF and no wall motion abnormalities  - Continue care per primary medicine team    Case discussed with cardiology fellow Reji Zimmerman MD  Should patient HD status change, please call cardiology at #[phone] once again.  Attending attestation to follow for final recommendations

## 2023-12-21 NOTE — DISCHARGE NOTE PROVIDER - HOSPITAL COURSE
HPI:  53 year old male, history of CAD, STEMI in October 2023 - s/p stent to  100% pRCA and 2 stents to  100% mRCA 2023. Pt c/o of having persistent chest pain x 3 weeks, described as both vibrations, burning, and intermittent heaviness.  Reports pain worsens after eating as well as with sleeping, currently a 5/10 at this time.  States at its worst is a 10/10 which occurred this morning but did not take any other medications today other than his current medication regimen.  Patient followed up with Cardiologist last month due to this complaint, was advised possible gas related pain, no relief with OTC antiacids.  Patient reports no fever, chills, cough, shortness of breath, dizziness, nausea, vomiting, diarrhea, myalgia, dysuria. (20 Dec 2023 23:18)      HOSPITAL COURSE:  Vital Signs Last 24 Hrs  T(C): 37.1 (21 Dec 2023 10:40), Max: 37.2 (20 Dec 2023 15:33)  T(F): 98.8 (21 Dec 2023 10:40), Max: 99 (20 Dec 2023 15:33)  HR: 71 (21 Dec 2023 10:40) (60 - 85)  BP: 133/88 (21 Dec 2023 10:40) (113/80 - 155/97)  BP(mean): --  RR: 19 (21 Dec 2023 10:40) (16 - 19)  SpO2: 100% (21 Dec 2023 10:40) (98% - 100%)    Parameters below as of 21 Dec 2023 10:40  Patient On (Oxygen Delivery Method): room air    #Atypical Chest pain.   EKG: no acute ischemic changes, unchanged compared to EKG from 10/4/23  Reviewed October Cardiac cath and TTE  CXR: clear lungs   -Cardiology c/s appreciated-chest pain not similar to prev cp prior to MI not on exertion, worsened by food intake and laying down immediately after. Sudhakar not consistent with acs  would not treat as ACS. Can monitor on tele while here  -c/w Brilinta BID and ASA, BB, Statin  -chest pain alleviated with famotidine. Will c/w pantoprazole for Gastritis treatment.    Patient currently denies chest pain, sob, palpitations, dizziness or lightheadedness. Case d/w attending (Dr. Hardin): Pt to be dc'd home and F/U with his PCP and Cardiologist as instructed.>>> <<< HPI:  53 year old male, history of CAD, STEMI in October 2023 - s/p stent to  100% pRCA and 2 stents to  100% mRCA 2023. Pt c/o of having persistent chest pain x 3 weeks, described as both vibrations, burning, and intermittent heaviness.  Reports pain worsens after eating as well as with sleeping, currently a 5/10 at this time.  States at its worst is a 10/10 which occurred this morning but did not take any other medications today other than his current medication regimen.  Patient followed up with Cardiologist last month due to this complaint, was advised possible gas related pain, no relief with OTC antiacids.  Patient reports no fever, chills, cough, shortness of breath, dizziness, nausea, vomiting, diarrhea, myalgia, dysuria. (20 Dec 2023 23:18)      HOSPITAL COURSE:  Vital Signs Last 24 Hrs  T(C): 37.1 (21 Dec 2023 10:40), Max: 37.2 (20 Dec 2023 15:33)  T(F): 98.8 (21 Dec 2023 10:40), Max: 99 (20 Dec 2023 15:33)  HR: 71 (21 Dec 2023 10:40) (60 - 85)  BP: 133/88 (21 Dec 2023 10:40) (113/80 - 155/97)  BP(mean): --  RR: 19 (21 Dec 2023 10:40) (16 - 19)  SpO2: 100% (21 Dec 2023 10:40) (98% - 100%)    Parameters below as of 21 Dec 2023 10:40  Patient On (Oxygen Delivery Method): room air    #Atypical Chest pain.   EKG: no acute ischemic changes, unchanged compared to EKG from 10/4/23  Reviewed October Cardiac cath and TTE  CXR: clear lungs   -Cardiology c/s appreciated-chest pain not similar to prev cp prior to MI not on exertion, worsened by food intake and laying down immediately after. Sudhakar not consistent with acs, would not treat as ACS. Can monitor on tele while here  -c/w Brilinta BID and ASA, BB, Statin  -chest pain alleviated with famotidine. Will c/w pantoprazole for likely gastritis/PUD treatment.   Medically stable for discharge home.    Patient currently denies any chest pain, sob, palpitations, dizziness or lightheadedness. Case d/w attending (Dr. Hardin): Pt to be dc'd home and F/U with his PCP and Cardiologist as instructed.>>> <<<

## 2023-12-21 NOTE — DISCHARGE NOTE PROVIDER - NSDCMRMEDTOKEN_GEN_ALL_CORE_FT
Aspirin Enteric Coated 81 mg oral delayed release tablet: 1 tab(s) orally once a day  atorvastatin 80 mg oral tablet: 1 tab(s) orally once a day (at bedtime)  Brilinta (ticagrelor) 90 mg oral tablet: 1 tab(s) orally 2 times a day  losartan 25 mg oral tablet: 0.5 tab(s) orally once a day Take half of the pill every day  pantoprazole 40 mg oral delayed release tablet: 1 tab(s) orally once a day (before a meal)  Toprol-XL 25 mg oral tablet, extended release: 1 tab(s) orally once a day

## 2023-12-21 NOTE — DISCHARGE NOTE PROVIDER - NSDCFUSCHEDAPPT_GEN_ALL_CORE_FT
Constantine Burrows  [facility] Physician [facility]  CARDIOLOGY [id number]  Scheduled Appointment: 01/17/2024     Constantine Burrows  [facility] Physician [facility]  CARDIOLOGY [phone]  Scheduled Appointment: 01/17/2024

## 2023-12-21 NOTE — CONSULT NOTE ADULT - NS ATTEND AMEND GEN_ALL_CORE FT
personally saw and examined patient   labs and vitals reviewed  agree with above assessment and plan  53M w CAD/IWSTEMI s/p pci x3 to rca 10/23 now p/w atypical cp  cp not similar to prev cp prior to MI  not on exertion, worsened by food intake and laying down immediately after  CE not consistent with acs  would not treat as ACS  can monitor on tele while here  w/u of non-cardiac etiology of symptoms per primry team  will follow with you

## 2023-12-21 NOTE — DISCHARGE NOTE NURSING/CASE MANAGEMENT/SOCIAL WORK - NSDCPEFALRISK_GEN_ALL_CORE
For information on Fall & Injury Prevention, visit: [email or web address] OR  [email or web address] OR  https://www.cdc.gov/steadi/patient.html For information on Fall & Injury Prevention, visit: [email or web address] OR  [email or web address] OR  https://www.cdc.gov/steadi/patient.html

## 2023-12-21 NOTE — DISCHARGE NOTE PROVIDER - NSDCCPCAREPLAN_GEN_ALL_CORE_FT
PRINCIPAL DISCHARGE DIAGNOSIS  Diagnosis: Atypical chest pain  Assessment and Plan of Treatment: You were evaluated here for chest pain.   Your Cardiac enzymes and EKG were normal.  You were seen and evaluated by cardiology team.  It was likely gastritis, you responded well with famotidine.  Take medications as instructed on discharge. Wait at least 2 hours before lying down after eating meals.  Follow up with your PCP and Cardiologist as instructed.  If chest pain gets worse and/or you feel SOB before you have chance to see your PCP or Cardiolgist, may return back to Emergency Room.      SECONDARY DISCHARGE DIAGNOSES  Diagnosis: CAD (coronary artery disease)  Assessment and Plan of Treatment: Take medications as instructed on discharge  Follow up with your PCP and Cardiologist as instructed.    Diagnosis: HTN (hypertension)  Assessment and Plan of Treatment: Take medications as instructed on discharge  Follow up with your PCP and Cardiologist as instructed.     PRINCIPAL DISCHARGE DIAGNOSIS  Diagnosis: Atypical chest pain  Assessment and Plan of Treatment: You were evaluated here for chest pain.   Your Cardiac enzymes and EKG were normal.  You were seen and evaluated by cardiology team.  It is likely due to gastritis and/or peptic ulcer disease, you responded well with famotidine. Take medications as instructed on discharge (pantoprazole). Wait at least 2 hours before lying down after eating meals.Follow up with your PCP and Cardiologist as instructed.  If chest pain gets worse and/or you feel SOB before you have chance to see your PCP or Cardiologist, may return back to Emergency Room.      SECONDARY DISCHARGE DIAGNOSES  Diagnosis: CAD (coronary artery disease)  Assessment and Plan of Treatment: Take medications as instructed on discharge  Follow up with your PCP and Cardiologist as instructed.    Diagnosis: HTN (hypertension)  Assessment and Plan of Treatment: Take medications as instructed on discharge  Follow up with your PCP and Cardiologist as instructed.

## 2023-12-21 NOTE — DISCHARGE NOTE PROVIDER - ATTENDING DISCHARGE PHYSICAL EXAMINATION:
VSS, NAD, feeling better today, chest pain has improved, is more like a burning sensation that gets worse when he eats or at night time  Chest: CTA B/L  Heart: S1S2 Ok  Abd: Soft/NT/ND  Extrem: No edema

## 2023-12-21 NOTE — DISCHARGE NOTE PROVIDER - PROVIDER TOKENS
PROVIDER:[TOKEN:[[id number]],FOLLOWUP:[1 week]] PROVIDER:[TOKEN:[[id number]],FOLLOWUP:[1 week]]

## 2023-12-21 NOTE — DISCHARGE NOTE NURSING/CASE MANAGEMENT/SOCIAL WORK - PATIENT PORTAL LINK FT
You can access the FollowMyHealth Patient Portal offered by [facility] by registering at the following website: [email or web address]. By joining [employer]’s FollowMyHealth portal, you will also be able to view your health information using other applications (apps) compatible with our system. You can access the FollowMyHealth Patient Portal offered by [facility] by registering at the following website: [email or web address]. By joining [employer]’s FollowMyHealth portal, you will also be able to view your health information using other applications (apps) compatible with our system.

## 2023-12-21 NOTE — DISCHARGE NOTE PROVIDER - CARE PROVIDER_API CALL
[facility]  Internal Medicine  [address] [place] [address]  Phone: [phone]  Fax: [phone]  Follow Up Time: 1 week   [facility]  Internal Medicine  [address] [place] [address]  Phone: [phone]  Fax: [phone]  Follow Up Time: 1 week

## 2024-01-17 ENCOUNTER — NON-APPOINTMENT (OUTPATIENT)
Age: 54
End: 2024-01-17

## 2024-01-17 ENCOUNTER — APPOINTMENT (OUTPATIENT)
Dept: CARDIOLOGY | Facility: CLINIC | Age: 54
End: 2024-01-17
Payer: MEDICAID

## 2024-01-17 VITALS
WEIGHT: 152 LBS | BODY MASS INDEX: 25.33 KG/M2 | HEIGHT: 65 IN | SYSTOLIC BLOOD PRESSURE: 126 MMHG | DIASTOLIC BLOOD PRESSURE: 86 MMHG | HEART RATE: 71 BPM | OXYGEN SATURATION: 98 %

## 2024-01-17 PROBLEM — I25.10 ATHEROSCLEROTIC HEART DISEASE OF NATIVE CORONARY ARTERY WITHOUT ANGINA PECTORIS: Chronic | Status: ACTIVE | Noted: 2023-12-20

## 2024-01-17 PROBLEM — I21.3 ST ELEVATION (STEMI) MYOCARDIAL INFARCTION OF UNSPECIFIED SITE: Chronic | Status: ACTIVE | Noted: 2023-12-20

## 2024-01-17 PROCEDURE — 93000 ELECTROCARDIOGRAM COMPLETE: CPT

## 2024-01-17 PROCEDURE — 99214 OFFICE O/P EST MOD 30 MIN: CPT | Mod: 25

## 2024-01-21 NOTE — HISTORY OF PRESENT ILLNESS
[FreeTextEntry1] : Has been experiencing occasional chest discomfort unrelated to exertion. Denies shortness of breath or palpitations.

## 2024-01-21 NOTE — CARDIOLOGY SUMMARY
[de-identified] : 01/17/24 - normal sinus rhythm, old inferior infarct, T-wave abnormality, consider inferior ischemia (unchanged) [de-identified] : 10/05/23 - normal LA, normal LV and RV size and function, LVEF 64% [de-identified] : 10/04/23 (PCI) - QIANA FRONTIER stents to mRCA 100% and pRCA 60% 10/04/23 (CATH) - mLAD 60%, dLAD 20%, pRCA 60%, mRCA 100%, LVEF 40%

## 2024-01-21 NOTE — DISCUSSION/SUMMARY
[Coronary Artery Disease] : coronary artery disease [Hypertension] : hypertension [Unlikely Cardiac Ischemia (Low Prob.)] : chest pain unlikely to represent cardiac ischemia (low probability) [Stable] : stable [FreeTextEntry1] : Currently stable from a cardiovascular standpoint. Normotensive. Euvolemic. Stable CAD (s/p STEMI -> RCA stents, mid LAD 60%) with preserved LV systolic function (LVEF 63%). Atypical chest pains. Continue current medications including aspirin and Brilinta. ECG completed today and reviewed (findings as noted above). Will schedule an exercise ECG stress test to rule out any significant CAD. Pending test results, I will make further recommendations. Follow up in 3 months. [EKG obtained to assist in diagnosis and management of assessed problem(s)] : EKG obtained to assist in diagnosis and management of assessed problem(s)

## 2024-01-23 ENCOUNTER — APPOINTMENT (OUTPATIENT)
Dept: CV DIAGNOSTICS | Facility: HOSPITAL | Age: 54
End: 2024-01-23

## 2024-01-23 ENCOUNTER — OUTPATIENT (OUTPATIENT)
Dept: OUTPATIENT SERVICES | Facility: HOSPITAL | Age: 54
LOS: 1 days | End: 2024-01-23
Payer: MEDICAID

## 2024-01-23 DIAGNOSIS — R07.9 CHEST PAIN, UNSPECIFIED: ICD-10-CM

## 2024-01-23 PROCEDURE — 93016 CV STRESS TEST SUPVJ ONLY: CPT | Mod: GC

## 2024-01-23 PROCEDURE — 93018 CV STRESS TEST I&R ONLY: CPT | Mod: GC

## 2024-02-08 ENCOUNTER — APPOINTMENT (OUTPATIENT)
Dept: CV DIAGNOSTICS | Facility: HOSPITAL | Age: 54
End: 2024-02-08

## 2024-02-08 ENCOUNTER — OUTPATIENT (OUTPATIENT)
Dept: OUTPATIENT SERVICES | Facility: HOSPITAL | Age: 54
LOS: 1 days | End: 2024-02-08
Payer: MEDICAID

## 2024-02-08 ENCOUNTER — INPATIENT (INPATIENT)
Facility: HOSPITAL | Age: 54
LOS: 1 days | Discharge: HOME | End: 2024-02-10
Attending: INTERNAL MEDICINE | Admitting: INTERNAL MEDICINE
Payer: MEDICAID

## 2024-02-08 VITALS
RESPIRATION RATE: 18 BRPM | DIASTOLIC BLOOD PRESSURE: 128 MMHG | TEMPERATURE: 98 F | OXYGEN SATURATION: 100 % | SYSTOLIC BLOOD PRESSURE: 170 MMHG | HEART RATE: 70 BPM

## 2024-02-08 DIAGNOSIS — R07.9 CHEST PAIN, UNSPECIFIED: ICD-10-CM

## 2024-02-08 LAB
ALBUMIN SERPL ELPH-MCNC: 4.4 G/DL — SIGNIFICANT CHANGE UP (ref 3.3–5)
ALP SERPL-CCNC: 70 U/L — SIGNIFICANT CHANGE UP (ref 40–120)
ALT FLD-CCNC: 24 U/L — SIGNIFICANT CHANGE UP (ref 4–41)
ANION GAP SERPL CALC-SCNC: 11 MMOL/L — SIGNIFICANT CHANGE UP (ref 7–14)
AST SERPL-CCNC: 22 U/L — SIGNIFICANT CHANGE UP (ref 4–40)
BASE EXCESS BLDV CALC-SCNC: 1.4 MMOL/L — SIGNIFICANT CHANGE UP (ref -2–3)
BASOPHILS # BLD AUTO: 0.05 K/UL — SIGNIFICANT CHANGE UP (ref 0–0.2)
BASOPHILS NFR BLD AUTO: 0.7 % — SIGNIFICANT CHANGE UP (ref 0–2)
BILIRUB SERPL-MCNC: 0.5 MG/DL — SIGNIFICANT CHANGE UP (ref 0.2–1.2)
BLOOD GAS VENOUS COMPREHENSIVE RESULT: SIGNIFICANT CHANGE UP
BUN SERPL-MCNC: 13 MG/DL — SIGNIFICANT CHANGE UP (ref 7–23)
CALCIUM SERPL-MCNC: 9.7 MG/DL — SIGNIFICANT CHANGE UP (ref 8.4–10.5)
CHLORIDE BLDV-SCNC: 104 MMOL/L — SIGNIFICANT CHANGE UP (ref 96–108)
CHLORIDE SERPL-SCNC: 104 MMOL/L — SIGNIFICANT CHANGE UP (ref 98–107)
CO2 BLDV-SCNC: 28.6 MMOL/L — HIGH (ref 22–26)
CO2 SERPL-SCNC: 26 MMOL/L — SIGNIFICANT CHANGE UP (ref 22–31)
CREAT SERPL-MCNC: 0.86 MG/DL — SIGNIFICANT CHANGE UP (ref 0.5–1.3)
EGFR: 102 ML/MIN/1.73M2 — SIGNIFICANT CHANGE UP
EOSINOPHIL # BLD AUTO: 0.04 K/UL — SIGNIFICANT CHANGE UP (ref 0–0.5)
EOSINOPHIL NFR BLD AUTO: 0.5 % — SIGNIFICANT CHANGE UP (ref 0–6)
GAS PNL BLDV: 137 MMOL/L — SIGNIFICANT CHANGE UP (ref 136–145)
GLUCOSE BLDV-MCNC: 91 MG/DL — SIGNIFICANT CHANGE UP (ref 70–99)
GLUCOSE SERPL-MCNC: 93 MG/DL — SIGNIFICANT CHANGE UP (ref 70–99)
HCO3 BLDV-SCNC: 27 MMOL/L — SIGNIFICANT CHANGE UP (ref 22–29)
HCT VFR BLD CALC: 44.4 % — SIGNIFICANT CHANGE UP (ref 39–50)
HCT VFR BLDA CALC: 45 % — SIGNIFICANT CHANGE UP (ref 39–51)
HGB BLD CALC-MCNC: 15 G/DL — SIGNIFICANT CHANGE UP (ref 12.6–17.4)
HGB BLD-MCNC: 14.5 G/DL — SIGNIFICANT CHANGE UP (ref 13–17)
IANC: 3.98 K/UL — SIGNIFICANT CHANGE UP (ref 1.8–7.4)
IMM GRANULOCYTES NFR BLD AUTO: 0.3 % — SIGNIFICANT CHANGE UP (ref 0–0.9)
LACTATE BLDV-MCNC: 1.5 MMOL/L — SIGNIFICANT CHANGE UP (ref 0.5–2)
LYMPHOCYTES # BLD AUTO: 2.83 K/UL — SIGNIFICANT CHANGE UP (ref 1–3.3)
LYMPHOCYTES # BLD AUTO: 37.8 % — SIGNIFICANT CHANGE UP (ref 13–44)
MCHC RBC-ENTMCNC: 25.1 PG — LOW (ref 27–34)
MCHC RBC-ENTMCNC: 32.7 GM/DL — SIGNIFICANT CHANGE UP (ref 32–36)
MCV RBC AUTO: 76.8 FL — LOW (ref 80–100)
MONOCYTES # BLD AUTO: 0.57 K/UL — SIGNIFICANT CHANGE UP (ref 0–0.9)
MONOCYTES NFR BLD AUTO: 7.6 % — SIGNIFICANT CHANGE UP (ref 2–14)
NEUTROPHILS # BLD AUTO: 3.98 K/UL — SIGNIFICANT CHANGE UP (ref 1.8–7.4)
NEUTROPHILS NFR BLD AUTO: 53.1 % — SIGNIFICANT CHANGE UP (ref 43–77)
NRBC # BLD: 0 /100 WBCS — SIGNIFICANT CHANGE UP (ref 0–0)
NRBC # FLD: 0 K/UL — SIGNIFICANT CHANGE UP (ref 0–0)
NT-PROBNP SERPL-SCNC: 83 PG/ML — SIGNIFICANT CHANGE UP
PCO2 BLDV: 46 MMHG — SIGNIFICANT CHANGE UP (ref 42–55)
PH BLDV: 7.38 — SIGNIFICANT CHANGE UP (ref 7.32–7.43)
PLATELET # BLD AUTO: 359 K/UL — SIGNIFICANT CHANGE UP (ref 150–400)
PO2 BLDV: 33 MMHG — SIGNIFICANT CHANGE UP (ref 25–45)
POTASSIUM BLDV-SCNC: 4 MMOL/L — SIGNIFICANT CHANGE UP (ref 3.5–5.1)
POTASSIUM SERPL-MCNC: 3.8 MMOL/L — SIGNIFICANT CHANGE UP (ref 3.5–5.3)
POTASSIUM SERPL-SCNC: 3.8 MMOL/L — SIGNIFICANT CHANGE UP (ref 3.5–5.3)
PROT SERPL-MCNC: 7.9 G/DL — SIGNIFICANT CHANGE UP (ref 6–8.3)
RBC # BLD: 5.78 M/UL — SIGNIFICANT CHANGE UP (ref 4.2–5.8)
RBC # FLD: 16.7 % — HIGH (ref 10.3–14.5)
SAO2 % BLDV: 57.2 % — LOW (ref 67–88)
SODIUM SERPL-SCNC: 141 MMOL/L — SIGNIFICANT CHANGE UP (ref 135–145)
TROPONIN T, HIGH SENSITIVITY RESULT: 11 NG/L — SIGNIFICANT CHANGE UP
TROPONIN T, HIGH SENSITIVITY RESULT: 8 NG/L — SIGNIFICANT CHANGE UP
WBC # BLD: 7.49 K/UL — SIGNIFICANT CHANGE UP (ref 3.8–10.5)
WBC # FLD AUTO: 7.49 K/UL — SIGNIFICANT CHANGE UP (ref 3.8–10.5)

## 2024-02-08 PROCEDURE — 93016 CV STRESS TEST SUPVJ ONLY: CPT | Mod: GC,MH

## 2024-02-08 PROCEDURE — 78451 HT MUSCLE IMAGE SPECT SING: CPT | Mod: 26,MH

## 2024-02-08 PROCEDURE — 99285 EMERGENCY DEPT VISIT HI MDM: CPT

## 2024-02-08 PROCEDURE — 71046 X-RAY EXAM CHEST 2 VIEWS: CPT | Mod: 26

## 2024-02-08 PROCEDURE — 93018 CV STRESS TEST I&R ONLY: CPT | Mod: GC,MH

## 2024-02-08 RX ORDER — ATORVASTATIN CALCIUM 80 MG/1
80 TABLET, FILM COATED ORAL AT BEDTIME
Refills: 0 | Status: DISCONTINUED | OUTPATIENT
Start: 2024-02-08 | End: 2024-02-10

## 2024-02-08 RX ORDER — ASPIRIN/CALCIUM CARB/MAGNESIUM 324 MG
81 TABLET ORAL DAILY
Refills: 0 | Status: DISCONTINUED | OUTPATIENT
Start: 2024-02-08 | End: 2024-02-10

## 2024-02-08 RX ORDER — TICAGRELOR 90 MG/1
90 TABLET ORAL EVERY 12 HOURS
Refills: 0 | Status: DISCONTINUED | OUTPATIENT
Start: 2024-02-08 | End: 2024-02-10

## 2024-02-08 RX ORDER — ACETAMINOPHEN 500 MG
650 TABLET ORAL EVERY 6 HOURS
Refills: 0 | Status: DISCONTINUED | OUTPATIENT
Start: 2024-02-08 | End: 2024-02-10

## 2024-02-08 RX ORDER — METOPROLOL TARTRATE 50 MG
25 TABLET ORAL
Refills: 0 | Status: DISCONTINUED | OUTPATIENT
Start: 2024-02-08 | End: 2024-02-10

## 2024-02-08 RX ORDER — LOSARTAN POTASSIUM 100 MG/1
25 TABLET, FILM COATED ORAL DAILY
Refills: 0 | Status: DISCONTINUED | OUTPATIENT
Start: 2024-02-08 | End: 2024-02-10

## 2024-02-08 NOTE — CONSULT NOTE ADULT - ATTENDING COMMENTS
Atypical chest pain.  Non-diagnostic exercise stress test (70% MPHR)  Abnormal rest perfusion imaging during chest pain, but defect in the area of a known infarction.  Still need to evaluate the LAD territory.   After discussion with Dr. Burrows (his outpatient cardiologist), will schedule pharm nuclear stress test for today.

## 2024-02-08 NOTE — CONSULT NOTE ADULT - ASSESSMENT
53M w/ PMH of CAD, IWSTEMI 10/2023 s/p PCI to RCA x2 presenting with atypical chest pain, not related to exertion. NST w/ fixed defect in the inferior wall that likely corresponds to prior RCA STEMI, although some area of possible viability. It is possible patient's "vibrations" are related to frequent PVCs that are noted on EKG and during stress. ACS is low on the differential, troponin is negative.     Recs:  -c/w ASA, brillinta  -increase toprol to metop tartrate 25 BID due to PVCs  -c/w atorva 80, losart 25  -monitor on telemetry  -will re-eval in the AM

## 2024-02-08 NOTE — CONSULT NOTE ADULT - SUBJECTIVE AND OBJECTIVE BOX
Pawel Lopez MD  Cardiology Fellow  [phone]  All Cardiology service information can be found 24/7 on [email or web address], password: [email or web address]    Patient seen and evaluated at bedside    HPI: 53M w/ PMH of CAD, IWSTEMI 10/2023 s/p PCI to RCA x2 presenting with chest discomfort during stress test. Patient went for NST today due to ongoing atypical chest pain. NST notable for small defect in inferior wall suggestive of infarct with possible viability. Had a treadmill stress prior to this that did not show ischemic changes, although patient only achieved 5.9 METs and only reached 70% of maximal heart rate. Patient describes his chest discomfort as vibrations, sometimes palpitations, heaviness, worse with eating and laying flat, not worse with walking. Has been ongoing for at least a week. Patient had an ED visit in December for similar complaints that was felt to be noncardiac. EKG w/ inferior Q waves and PVCs. Initial trop 11.     Cardiac Meds: ASA, brillinta, toprol 25, atorva 80, losart 25    PMHx:   Hypertension    CAD (coronary artery disease)    STEMI (ST elevation myocardial infarction)        PSHx:   No significant past surgical history        Allergies:  No Known Allergies      Current Medications:       FAMILY HISTORY:  Family history of heart attack (Father)          REVIEW OF SYSTEMS:  CONSTITUTIONAL: No weakness, fevers or chills  EYES/ENT: No visual changes;  No dysphagia  NECK: No pain or stiffness  RESPIRATORY: No cough, wheezing, hemoptysis; No shortness of breath  CARDIOVASCULAR: No chest pain or palpitations; No lower extremity edema  GASTROINTESTINAL: No abdominal or epigastric pain. No nausea, vomiting, or hematemesis; No diarrhea or constipation. No melena or hematochezia.  BACK: No back pain  GENITOURINARY: No dysuria, frequency or hematuria  NEUROLOGICAL: No numbness or weakness  SKIN: No itching, burning, rashes, or lesions   All other review of systems is negative unless indicated above.    Physical Exam:  T(F): 98.1 (02-08), Max: 98.1 (02-08)  HR: 70 (02-08) (70 - 70)  BP: 170/128 (02-08) (170/128 - 170/128)  RR: 18 (02-08)  SpO2: 100% (02-08)  GEN: NAD  HEENT: EOMI, clear sclera  PULM: CTA b/l, no wheeze  CV: RRR S1 S2, no murmur, no JVD  ABD: S, NT, ND  EXT: WWP, no edema  PSYCH: normal affect  SKIN: No rash    CXR: Personally reviewed    Labs: Personally reviewed                        14.5   7.49  )-----------( 359      ( 08 Feb 2024 16:51 )             44.4     02-08    141  |  104  |  13  ----------------------------<  93  3.8   |  26  |  0.86    Ca    9.7      08 Feb 2024 16:51    TPro  7.9  /  Alb  4.4  /  TBili  0.5  /  DBili  x   /  AST  22  /  ALT  24  /  AlkPhos  70  02-08        CARDIAC MARKERS ( 08 Feb 2024 16:51 )  11 ng/L / x     / x     / x     / x     / x

## 2024-02-08 NOTE — ED PROVIDER NOTE - CLINICAL SUMMARY MEDICAL DECISION MAKING FREE TEXT BOX
Nikky: STEMI (stents). P/w 1 wk CP and palpitation. Not likely PNA: no infectious Sx (eg, purulent cough). Not likely PE or other VTE: PERC or Wells low score, EKG no e/o R-heart strain. Had nuc stress test showing RCA lesion w/ viability. Check EKG, CXR, trop. Admit for likely RCA stent.

## 2024-02-08 NOTE — ED PROVIDER NOTE - ATTENDING CONTRIBUTION TO CARE
I performed a face-to-face evaluation of the patient and performed a history and physical examination. I agree with the history and physical examination. If this was a PA visit, I personally saw the patient with the PA and performed a substantive portion of the visit including all aspects of the medical decision making.    Nikky: STEMI (stents). P/w 1 wk CP and palpitation. Not likely PNA: no infectious Sx (eg, purulent cough). Not likely PE or other VTE: PERC or Wells low score, EKG no e/o R-heart strain. Had nuc stress test showing RCA lesion w/ viability. Check EKG, CXR, trop. Admit for likely RCA stent.

## 2024-02-08 NOTE — ED ADULT NURSE NOTE - OBJECTIVE STATEMENT
A&OX4, awake, and alert, ambulatory, Patient is coming to ED in regards to chest pain. Patient was at a stress test and states he has decrease bloodflow to inferior wall. Patient states chest pain is constant, worsen with ambulaiton, no A&OX4, awake, and alert, ambulatory, Patient is coming to ED in regards to chest pain. Patient was at a stress test and states he has decrease blood-flow to inferior wall. Patient states chest pain is constant, worsen with ambulation, no alleviating factors. h/o STEMI in october, CAD. Patient denies weakness, blurred vision, no facial droop or slurred speech noted. Patient arrives with a patent 22G IV to right AC placed by EMS. Patient on NSR on tele, RA, Will continue to monitor.

## 2024-02-08 NOTE — ED ADULT TRIAGE NOTE - CHIEF COMPLAINT QUOTE
patient coming from out patient stress test for + stress, with (+ pictures inf wall defect). Patient c/o chest pain. MD Burrows sent for admission. PHX stemi, cad.

## 2024-02-08 NOTE — ED PROVIDER NOTE - PROGRESS NOTE DETAILS
Oma Weiss MD PGY3: Patient seen by Cards. State chest pain more likely palpitations from PVCs. Will reassess in the AM. Trend troponins.

## 2024-02-09 DIAGNOSIS — R07.89 OTHER CHEST PAIN: ICD-10-CM

## 2024-02-09 DIAGNOSIS — R94.39 ABNORMAL RESULT OF OTHER CARDIOVASCULAR FUNCTION STUDY: ICD-10-CM

## 2024-02-09 DIAGNOSIS — H51.8 OTHER SPECIFIED DISORDERS OF BINOCULAR MOVEMENT: ICD-10-CM

## 2024-02-09 DIAGNOSIS — Z29.9 ENCOUNTER FOR PROPHYLACTIC MEASURES, UNSPECIFIED: ICD-10-CM

## 2024-02-09 DIAGNOSIS — R53.83 OTHER FATIGUE: ICD-10-CM

## 2024-02-09 DIAGNOSIS — I25.10 ATHEROSCLEROTIC HEART DISEASE OF NATIVE CORONARY ARTERY WITHOUT ANGINA PECTORIS: ICD-10-CM

## 2024-02-09 DIAGNOSIS — I10 ESSENTIAL (PRIMARY) HYPERTENSION: ICD-10-CM

## 2024-02-09 LAB
A1C WITH ESTIMATED AVERAGE GLUCOSE RESULT: 5.8 % — HIGH (ref 4–5.6)
ESTIMATED AVERAGE GLUCOSE: 120 — SIGNIFICANT CHANGE UP
TSH SERPL-MCNC: 2.47 UIU/ML — SIGNIFICANT CHANGE UP (ref 0.27–4.2)
VIT B12 SERPL-MCNC: 518 PG/ML — SIGNIFICANT CHANGE UP (ref 200–900)

## 2024-02-09 PROCEDURE — 93571 IV DOP VEL&/PRESS C FLO 1ST: CPT | Mod: 26,LD

## 2024-02-09 PROCEDURE — 92928 PRQ TCAT PLMT NTRAC ST 1 LES: CPT | Mod: LD

## 2024-02-09 PROCEDURE — 93010 ELECTROCARDIOGRAM REPORT: CPT

## 2024-02-09 PROCEDURE — 93454 CORONARY ARTERY ANGIO S&I: CPT | Mod: 26,59

## 2024-02-09 PROCEDURE — 99223 1ST HOSP IP/OBS HIGH 75: CPT | Mod: GC

## 2024-02-09 PROCEDURE — 99222 1ST HOSP IP/OBS MODERATE 55: CPT

## 2024-02-09 RX ORDER — PANTOPRAZOLE SODIUM 20 MG/1
40 TABLET, DELAYED RELEASE ORAL
Refills: 0 | Status: DISCONTINUED | OUTPATIENT
Start: 2024-02-09 | End: 2024-02-10

## 2024-02-09 RX ADMIN — TICAGRELOR 90 MILLIGRAM(S): 90 TABLET ORAL at 18:57

## 2024-02-09 RX ADMIN — Medication 81 MILLIGRAM(S): at 11:46

## 2024-02-09 RX ADMIN — ATORVASTATIN CALCIUM 80 MILLIGRAM(S): 80 TABLET, FILM COATED ORAL at 23:12

## 2024-02-09 RX ADMIN — LOSARTAN POTASSIUM 25 MILLIGRAM(S): 100 TABLET, FILM COATED ORAL at 05:36

## 2024-02-09 RX ADMIN — TICAGRELOR 90 MILLIGRAM(S): 90 TABLET ORAL at 05:36

## 2024-02-09 RX ADMIN — Medication 25 MILLIGRAM(S): at 18:57

## 2024-02-09 RX ADMIN — Medication 25 MILLIGRAM(S): at 05:36

## 2024-02-09 NOTE — H&P ADULT - ASSESSMENT
53M hx of CAD and IW STEMI s/p PCI with 2 stents to mid RCA, HTN, who is here for atypical chest pain. His chest heaviness is not worsened by exertion, and occurs without any clear triggers. Patient with frequent PVCs which likely accounts for his palpitations. Unclear cause of his fatigue.

## 2024-02-09 NOTE — H&P ADULT - PROBLEM SELECTOR PLAN 2
Unclear cause of fatigue. No signs of HF on exam, and no HF in previous echos. May have had progression of CAD and new heart failure, but this is more unlikely. Patient's symptoms seemed to start when the palpitations began. Wonder if there may be another underlying arrhythmia or high PVC burden causing fatigue  - Telemetry  - TSH Unclear cause of fatigue. No signs of HF on exam, and no HF in previous echos. May have had progression of CAD and new heart failure, but this is more unlikely. Patient's symptoms seemed to start when the palpitations began. Wonder if there may be another underlying arrhythmia or high PVC burden causing fatigue  - Telemetry  - TSH, B12 Patient with chest pain that is not triggered by exertion, and chest heaviness that seems to occur randomly. MADHAVI score is low at 77. Also given negative cardiac enzymes, no ST segment changes on EKG, unlikely ACS at this time. No need for anticoagulation. Palpitations likely related to frequent PVCs.  - Cardiology currently following, recs appreciated  - Patient certainly high risk for ACS, although this does not seem like an ACS event at this time. May have superimposed angina in day to day life, but the current constellation of symptoms unlikely NSTE-ACS  - Reasonable to uptitrate the metoprolol for frequent PVCs seen on tele and EKG  - Telemetry given palpitations and persistent symptomatic PVCs  - Positional chest pain seems less likely cardiac in nature. Possibly related to GERD. Could trial PPI

## 2024-02-09 NOTE — H&P ADULT - PROBLEM SELECTOR PLAN 4
On losartan 25mg at home  - Continue losartan Hx of CAD s/p multiple stents on aspirin, brilinta, and atorvastatin.  - Continue aspirin, brilinta, atorvastatin  - Metoprolol 25 BID for frequent PVCs and anginal symptoms

## 2024-02-09 NOTE — H&P ADULT - NSHPSOCIALHISTORY_GEN_ALL_CORE
No longer drinking, used to drink 7-8 drinks per week up until last September. Lives with mother, father, sister. No longer drinking, used to drink 7-8 drinks per week up until last September. Lives with mother, father, sister.  Never smoker  Currently unemployed, previously employed as a    Denies illicit drug use

## 2024-02-09 NOTE — H&P ADULT - NSHPLABSRESULTS_GEN_ALL_CORE
Personally reviewed labs, imaging, ekg                           14.5   7.49  )-----------( 359      ( 08 Feb 2024 16:51 )             44.4       02-08    141  |  104  |  13  ----------------------------<  93  3.8   |  26  |  0.86    Ca    9.7      08 Feb 2024 16:51    TPro  7.9  /  Alb  4.4  /  TBili  0.5  /  DBili  x   /  AST  22  /  ALT  24  /  AlkPhos  70  02-08              Urinalysis Basic - ( 08 Feb 2024 16:51 )    Color: x / Appearance: x / SG: x / pH: x  Gluc: 93 mg/dL / Ketone: x  / Bili: x / Urobili: x   Blood: x / Protein: x / Nitrite: x   Leuk Esterase: x / RBC: x / WBC x   Sq Epi: x / Non Sq Epi: x / Bacteria: x            Lactate Trend            CAPILLARY BLOOD GLUCOSE                Personal interpretation EKG: Personally reviewed labs, imaging, ekg                         14.5   7.49  )-----------( 359      ( 08 Feb 2024 16:51 )             44.4     02-08    141  |  104  |  13  ----------------------------<  93  3.8   |  26  |  0.86    Ca    9.7      08 Feb 2024 16:51    TPro  7.9  /  Alb  4.4  /  TBili  0.5  /  DBili  x   /  AST  22  /  ALT  24  /  AlkPhos  70  02-08       Personal interpretation EKG: Personally reviewed labs, imaging, ekg                         14.5   7.49  )-----------( 359      ( 08 Feb 2024 16:51 )             44.4     02-08    141  |  104  |  13  ----------------------------<  93  3.8   |  26  |  0.86    Ca    9.7      08 Feb 2024 16:51    TPro  7.9  /  Alb  4.4  /  TBili  0.5  /  DBili  x   /  AST  22  /  ALT  24  /  AlkPhos  70  02-08       Troponin T, High Sensitivity Result: 8 ng/L (02.08.24 @ 19:45)  Troponin T, High Sensitivity Result: 11 ng/L (02.08.24 @ 16:51)    Pro-Brain Natriuretic Peptide: 83 pg/mL (02.08.24 @ 16:51)    16:51 - VBG - pH: 7.38  | pCO2: 46    | pO2: 33    | Lactate: 1.5      < from: Xray Chest 2 Views PA/Lat (02.08.24 @ 16:58) >  A nonspecific round nodular opacity is noted in the left lower lung field likely the nipple. No focal consolidation.  No pleural effusion. No pneumothorax. Cardiac silhouette size is normal. Coronary stents. No acute osseous findings.  IMPRESSION: Clear lungs.  < end of copied text >    EKG personally reviewed and interpreted - SR w/PVCs 80bpm, Q/TWI III/aVF, Q in V1, QTc 459ms -> EKG grossly unchanged from prior (with exception of PVCs)    < from: Nuclear Stress Test-Exercise.. (02.08.24 @ 11:52) >  Conclusions:   1. Myocardial Perfusion: the patient had chest heaviness and palpitations throughout the test. Upon completion he was sent to ED.   2. Baseline electrocardiogram: normal sinus rhythm at a rate of 80 bpm with frequent premature ventricular contractions, no significant STabnormalities and evidence of prior inferior infarct.   3. Qualitative Perfusion:      - small-sized, severe defect(s) in the inferior wall, doesn't normalize with prone imaging suggestive of an infarct with possible viability.   4. The left ventricle is normal in function and normal in size. Normal left ventricular diastolic function. The resting left ventricular EF is 68 %. The resting end diastolic volume is 100 ml and systolic volume is 32 ml.   5. Imaging: Tracer injection was done at rest, during period of chest pain.   6. Low normal right ventricular function. There is no right ventricular dilation. RVEF 48%; RVEDV 114 mL; RVESV 59 mL.   7. Hypokinesis of the proximal to mid wall.   8. Gated SPECT: Give evidence of some preserved inferior wall motion and thickening, findings suggest viable myocardium in RCA distribution.

## 2024-02-09 NOTE — H&P ADULT - PROBLEM SELECTOR PLAN 7
DVT: Patient is mobile and low risk for DVT  Diet: DASH/TLC  Dispo: Telemetry review DVT: Patient is mobile and low risk for DVT  Diet: DASH/TLC  Dispo: Telemetry review, cards recs

## 2024-02-09 NOTE — H&P ADULT - PROBLEM SELECTOR PLAN 1
Patient with chest pain that is not triggered by exertion, and chest heaviness that seems to occur randomly. MADHAVI score is low at 77. Also given negative cardiac enzymes, no ST segment changes on EKG, unlikely ACS at this time. No need for anticoagulation. Patient with chest pain that is not triggered by exertion, and chest heaviness that seems to occur randomly. MADHAVI score is low at 77. Also given negative cardiac enzymes, no ST segment changes on EKG, unlikely ACS at this time. No need for anticoagulation. Palpitations likely related to frequent PVCs.  - Cardiology currently following  - Patient certainly high risk for ACS, although this does not seem like an ACS event at this time. May have superimposed angina in day to day life, but the current constellation of symptoms unlikely NSTE-ACS  - Reasonable to uptitrate the metoprolol for frequent PVCs seen on tele and EKG  - Telemetry given palpitations and persistent symptomatic PVCs  - Positional chest pain seems less likely cardiac in nature. Possibly related to GERD. Could trial PPI Patient with possibly viable inferior wall defect. However, chest pain seems to be atypical and not necessarily related to myocardial oxygen demand. Given hx of CAD and STEMI, would still benefit from further evaluation, although there could be 2 separate processes, abnormal stress test and noncardiac pain.  - Cardiac viability study (CMR) vs catheterization. F/u cardiology recommendations

## 2024-02-09 NOTE — PATIENT PROFILE ADULT - FALL HARM RISK - RISK INTERVENTIONS
Bed in lowest position, wheels locked, appropriate side rails in place/Call bell, personal items and telephone in reach

## 2024-02-09 NOTE — H&P ADULT - PROBLEM SELECTOR PLAN 3
Hx of CAD s/p multiple stents on aspirin, brilinta, and atorvastatin.  - Continue aspirin, brilinta, atorvastatin  - Metoprolol 25 BID for frequent PVCs and anginal symptoms Patient with possibly viable inferior wall defect. However, chest pain seems to be atypical and not related to ACS. Given hx of CAD and STEMI, would likely benefit from further evaluation.  - Cardiac viability study (CMR) vs catheterization. F/u cardiology recommendations Patient with possibly viable inferior wall defect. However, chest pain seems to be atypical and not necessarily related to myocardial oxygen demand. Given hx of CAD and STEMI, would still benefit from further evaluation, although there could be 2 separate processes, abnormal stress test and noncardiac pain.  - Cardiac viability study (CMR) vs catheterization. F/u cardiology recommendations Unclear cause of fatigue. No signs of HF on exam, and no HF in previous echos. May have had progression of CAD and new heart failure, but this is more unlikely given recent stress results and pro-BNP wnl. Patient's symptoms seemed to start when the palpitations began. Wonder if there may be another underlying arrhythmia or high PVC burden causing fatigue  - Telemetry  - TSH, B12

## 2024-02-09 NOTE — H&P ADULT - ATTENDING COMMENTS
53M w/CAD s/p stents 10/2023, HTN, sent from Dr. Velasco's for abnml stress test, as above. Cardiology consult appreciated, increased metoprolol, confirm dose of ARB (12.5 vs 25).

## 2024-02-09 NOTE — H&P ADULT - NSHPREVIEWOFSYSTEMS_GEN_ALL_CORE
REVIEW OF SYSTEMS:    CONSTITUTIONAL: No weakness, (+) fever last week, reports night sweats 2d ago, denies chills or weight loss  EYES/ENT: No visual changes; No dysphagia; No sore throat; (+) chronic rhinorrhea; No sinus pain/pressure  NECK: No pain or stiffness  RESPIRATORY: No cough, wheezing, hemoptysis; No shortness of breath  CARDIOVASCULAR: (+) intermittent chest pain, palpitations; No lower extremity edema  GASTROINTESTINAL: No abdominal or epigastric pain. No nausea, vomiting, or hematemesis; No diarrhea or constipation. No melena or hematochezia.  GENITOURINARY: No dysuria, frequency or hematuria  NEUROLOGICAL: No numbness, paresthesias, or weakness; No HA; No LH/dizziness  MSK: ambulates without aid; No falls  SKIN: No itching, burning, rashes, or lesions   All other review of systems is negative unless indicated above.

## 2024-02-09 NOTE — CHART NOTE - NSCHARTNOTEFT_GEN_A_CORE
[facility] C[employer]    GARY PÉREZ  MRN-[id number]    Patient status post cath via RRA. Site clean, dry, intact. Pulses present, capillary refill appropriate. Without hematoma. Pt denies any other acute complaints.     Vital Signs Last 24 Hrs  T(C): 36.5 (09 Feb 2024 18:57), Max: 36.8 (08 Feb 2024 22:00)  T(F): 97.7 (09 Feb 2024 18:57), Max: 98.3 (08 Feb 2024 22:00)  HR: 72 (09 Feb 2024 18:57) (68 - 79)  BP: 133/81 (09 Feb 2024 18:57) (107/72 - 133/81)  BP(mean): --  RR: 18 (09 Feb 2024 18:57) (18 - 18)  SpO2: 99% (09 Feb 2024 18:57) (98% - 100%)    Parameters below as of 09 Feb 2024 18:57  Patient On (Oxygen Delivery Method): room air    Will continue to monitor closely.  RENETTA Dye PA-C  [id number]

## 2024-02-09 NOTE — PROGRESS NOTE ADULT - ASSESSMENT
53M w/ PMH of CAD, IWSTEMI 10/2023 s/p PCI to RCA x2 presenting with atypical chest pain, not related to exertion. NST w/ fixed defect in the inferior wall that likely corresponds to prior RCA STEMI, although some area of possible viability. It is possible patient's "vibrations" are related to frequent PVCs that are noted on EKG and during stress. ACS is low on the differential, troponin is negative.     Recs:  -c/w ASA, brillinta, metop 25 BID  -c/w atorva 80, losart 25  -monitor on telemetry  -please order a pharm nuclear stress test  53M w/ PMH of CAD, IWSTEMI 10/2023 s/p PCI to RCA x2 presenting with atypical chest pain, not related to exertion. NST w/ fixed defect in the inferior wall that likely corresponds to prior RCA STEMI, although some area of possible viability. It is possible patient's "vibrations" are related to frequent PVCs that are noted on EKG and during stress. ACS is low on the differential, troponin is negative.     Recs:  -c/w ASA, brillinta, metop 25 BID  -c/w atorva 80, losart 25  -monitor on telemetry  53M w/ PMH of CAD, IWSTEMI 10/2023 s/p PCI to RCA x2 presenting with atypical chest pain, not related to exertion. NST w/ fixed defect in the inferior wall that likely corresponds to prior RCA STEMI, although some area of possible viability. LHC showing IFR positive lesion in LAD, s/p PCI.    Recs:  -c/w ASA, brillinta, metop 25 BID  -c/w atorva 80, losart 25  -monitor on telemetry  -follow up with Dr. Burrows in 3-4 weeks after discharge

## 2024-02-09 NOTE — H&P ADULT - NSICDXPASTMEDICALHX_GEN_ALL_CORE_FT
PAST MEDICAL HISTORY:  CAD (coronary artery disease)     Hypertension     STEMI (ST elevation myocardial infarction)      Moderna dose 1 and 2

## 2024-02-09 NOTE — H&P ADULT - NSHPPHYSICALEXAM_GEN_ALL_CORE
Vital Signs Last 24 Hrs  T(C): 36.8 (08 Feb 2024 22:00), Max: 36.8 (08 Feb 2024 22:00)  T(F): 98.3 (08 Feb 2024 22:00), Max: 98.3 (08 Feb 2024 22:00)  HR: 79 (08 Feb 2024 22:00) (70 - 79)  BP: 107/72 (08 Feb 2024 22:00) (107/72 - 170/128)  BP(mean): --  RR: 18 (08 Feb 2024 22:00) (18 - 18)  SpO2: 98% (08 Feb 2024 22:00) (98% - 100%)    Parameters below as of 08 Feb 2024 22:00  Patient On (Oxygen Delivery Method): room air    CONSTITUTIONAL: Well groomed, no apparent distress  ENMT: Oral mucosa with moist membranes.   RESP: No respiratory distress, no use of accessory muscles; CTA b/l, no WRR  CV: RRR, +S1S2, no MRG; no peripheral edema  GI: Soft, NT, ND  SKIN: No rashes or ulcers noted  NEURO: awake and alert Vital Signs Last 24 Hrs  T(C): 36.8 (08 Feb 2024 22:00), Max: 36.8 (08 Feb 2024 22:00)  T(F): 98.3 (08 Feb 2024 22:00), Max: 98.3 (08 Feb 2024 22:00)  HR: 79 (08 Feb 2024 22:00) (70 - 79)  BP: 107/72 (08 Feb 2024 22:00) (107/72 - 170/128)  BP(mean): --  RR: 18 (08 Feb 2024 22:00) (18 - 18)  SpO2: 98% (08 Feb 2024 22:00) (98% - 100%)    Parameters below as of 08 Feb 2024 22:00  Patient On (Oxygen Delivery Method): room air    CONSTITUTIONAL: Well groomed, no apparent distress  ENMT: Oral mucosa with moist membranes.   RESP: No respiratory distress, no use of accessory muscles; CTA b/l, no WRR  CV: RRR, +S1S2, no MRG; no peripheral edema  GI: Soft, NT, ND  SKIN: No rashes or ulcers noted  NEURO: awake and alert, dysconjugate gaze Vital Signs Last 24 Hrs  T(C): 36.8 (08 Feb 2024 22:00), Max: 36.8 (08 Feb 2024 22:00)  T(F): 98.3 (08 Feb 2024 22:00), Max: 98.3 (08 Feb 2024 22:00)  HR: 79 (08 Feb 2024 22:00) (70 - 79)  BP: 107/72 (08 Feb 2024 22:00) (107/72 - 170/128)  RR: 18 (08 Feb 2024 22:00) (18 - 18)  SpO2: 98% (08 Feb 2024 22:00) (98% - 100%)    Parameters below as of 08 Feb 2024 22:00  Patient On (Oxygen Delivery Method): room air    CONSTITUTIONAL: Well groomed, no apparent distress  ENMT: Oral mucosa with moist membranes.   RESP: No respiratory distress, no use of accessory muscles; CTA b/l, no WRR  CV: RRR, +S1S2, no MRG; no peripheral edema  GI: Soft, NT, ND  SKIN: No rashes or ulcers noted  NEURO: awake and alert, dysconjugate gaze Vital Signs Last 24 Hrs  T(C): 36.8 (08 Feb 2024 22:00), Max: 36.8 (08 Feb 2024 22:00)  T(F): 98.3 (08 Feb 2024 22:00), Max: 98.3 (08 Feb 2024 22:00)  HR: 79 (08 Feb 2024 22:00) (70 - 79)  BP: 107/72 (08 Feb 2024 22:00) (107/72 - 170/128)  RR: 18 (08 Feb 2024 22:00) (18 - 18)  SpO2: 98% (08 Feb 2024 22:00) (98% - 100%)    Parameters below as of 08 Feb 2024 22:00  Patient On (Oxygen Delivery Method): room air    CONSTITUTIONAL: Well groomed, no apparent distress  HEAD:  Atraumatic, Normocephalic  EYES: dysconjugate gaze, surgical R pupil, L pupil RRL, slight R scleral injection   NECK: No JVD  ENMT: Oral mucosa with moist membranes.   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

## 2024-02-09 NOTE — PATIENT PROFILE ADULT - STATED REASON FOR ADMISSION
Airway    Date/Time: 4/21/2022 2:05 PM  Performed by: Octavio Gómez M.D.  Authorized by: Octavio Gómez M.D.     Location:  OR  Urgency:  Elective  Indications for Airway Management:  Anesthesia      Spontaneous Ventilation: absent    Sedation Level:  Deep  Preoxygenated: Yes    Patient Position:  Sniffing  Mask Difficulty Assessment:  0 - not attempted  Final Airway Type:  Endotracheal airway  Final Endotracheal Airway:  ETT  Cuffed: Yes    Technique Used for Successful ETT Placement:  Video laryngoscopy    Insertion Site:  Oral  Blade Type:  Glide  Laryngoscope Blade/Videolaryngoscope Blade Size:  4  ETT Size (mm):  7.0  Measured from:  Teeth  ETT to Teeth (cm):  20  Placement Verified by: capnometry    Cormack-Lehane Classification:  Grade I - full view of glottis  Number of Attempts at Approach:  1          
Chest pain

## 2024-02-09 NOTE — H&P ADULT - PROBLEM SELECTOR PLAN 5
DVT: Patient is mobile and low risk for DVT  Diet: DASH/TLC  Dispo: Telemetry review Patient with history of dysconjugate gaze and ophthalmologic surgery in the past. States his vision is poor in his right side, but that this is stable. Patient with history of dysconjugate gaze and ophthalmologic surgery in the past. States his vision is poor in his right eye, but that this is stable.

## 2024-02-09 NOTE — CHART NOTE - NSCHARTNOTEFT_GEN_A_CORE
53M with PMH of CAD s/p stent, HTN sent in for abnormal stress.     Pt seen at bedside. States he was taken for stress, but because he had caffeine, test postponed.   Pt later appears to have been taken to cath. Will f/u results.   Otherwise, states he is feeling ok. No SOB/CP. Labs reviewed. On exam - NAD, chest clear, abd soft, NT. RRR, S1S2, no g/r/m. Neuro nonfocal.    Rest of plan as outlined in H/P. D/w ACP.

## 2024-02-09 NOTE — H&P ADULT - HISTORY OF PRESENT ILLNESS
53M hx of IW STEMI Oct 2023 s/p PCI with 2 stents to mid RCA, HTN, admitted for abnormal NST and chest heaviness. Patient was sent in from outside cardiologist due to abnormal nuclear stress test that showed inferior wall defect and possible viability in the distribution of the RCA. However, throughout the stress test patient was complaining of palpitations and chest heaviness.    Over the past week, he has had worsening fatigue, heaviness in the left side of his chest, and palpitations that all started concurrently. He states that there are no clear triggers for these issues, and that his fatigue and chest heaviness occur even without exertion. They are not triggered or worsened by exertion either. He believes that they are randomly triggered. He denies chest pressure/pain, SOB, or nausea/vomiting. Patient still notes palpitations at rest, and tele shows frequent PVCs. His chest heaviness is minimal at this point. EKG     Patient does note sharp pain in his left chest as well that worsens with position (ie. laying back) and is similar to the pain that he presented with in December. It is not alleviated by "acid medications." 53M hx of IW STEMI Oct 2023 s/p PCI with 2 stents to mid RCA, HTN, admitted for abnormal NST and chest heaviness. Patient was sent in from outside cardiologist due to abnormal nuclear stress test that showed inferior wall defect and possible viability in the distribution of the RCA. However, throughout the stress test patient was complaining of palpitations and chest heaviness.    Over the past week, he has had worsening fatigue, heaviness in the left side of his chest, and palpitations that all started concurrently. He states that there are no clear triggers for these issues, and that his fatigue and chest heaviness occur even without exertion. They are not triggered or worsened by exertion either. He believes that they are randomly triggered. He denies chest pressure/pain, SOB, or nausea/vomiting. Patient still notes palpitations at rest, and tele shows frequent PVCs. His chest heaviness is minimal at this point. EKG with PVCs but no obvious ST changes. Troponins negative.    Patient does note sharp pain in his left chest as well that worsens with position (ie. laying back) and is similar to the pain that he presented with in December. It is not alleviated by "acid medications." 53M hx of IW STEMI Oct 2023 s/p PCI with 2 stents to mid RCA, HTN, admitted for abnormal NST and chest heaviness. Patient was sent in from outside cardiologist (Dr. Reza Velasco) due to abnormal nuclear stress test that showed inferior wall defect and possible viability in the distribution of the RCA. However, throughout the stress test patient was complaining of palpitations and chest heaviness.    Over the past week, he has had worsening fatigue, heaviness in the left side of his chest, and palpitations that all started concurrently. He states that there are no clear triggers for these issues, and that his fatigue and chest heaviness occur even without exertion. They are not triggered or worsened by exertion either. He believes that they are randomly triggered. He denies chest pressure/pain, SOB, or nausea/vomiting. Patient still notes palpitations at rest, and tele shows frequent PVCs. His chest heaviness is minimal at this point. EKG with PVCs but no obvious ST changes. Troponins negative.    Patient does note sharp pain in his left chest as well that worsens with position (ie. laying back) and is similar to the pain that he presented with in December. It is not alleviated by "acid medications."

## 2024-02-10 ENCOUNTER — TRANSCRIPTION ENCOUNTER (OUTPATIENT)
Age: 54
End: 2024-02-10

## 2024-02-10 VITALS
DIASTOLIC BLOOD PRESSURE: 62 MMHG | HEART RATE: 59 BPM | SYSTOLIC BLOOD PRESSURE: 99 MMHG | RESPIRATION RATE: 15 BRPM | OXYGEN SATURATION: 98 % | TEMPERATURE: 98 F

## 2024-02-10 LAB
ANION GAP SERPL CALC-SCNC: 11 MMOL/L — SIGNIFICANT CHANGE UP (ref 7–14)
BUN SERPL-MCNC: 16 MG/DL — SIGNIFICANT CHANGE UP (ref 7–23)
CALCIUM SERPL-MCNC: 9.4 MG/DL — SIGNIFICANT CHANGE UP (ref 8.4–10.5)
CHLORIDE SERPL-SCNC: 102 MMOL/L — SIGNIFICANT CHANGE UP (ref 98–107)
CO2 SERPL-SCNC: 22 MMOL/L — SIGNIFICANT CHANGE UP (ref 22–31)
CREAT SERPL-MCNC: 0.83 MG/DL — SIGNIFICANT CHANGE UP (ref 0.5–1.3)
EGFR: 103 ML/MIN/1.73M2 — SIGNIFICANT CHANGE UP
GLUCOSE SERPL-MCNC: 124 MG/DL — HIGH (ref 70–99)
HCT VFR BLD CALC: 43.9 % — SIGNIFICANT CHANGE UP (ref 39–50)
HGB BLD-MCNC: 14.7 G/DL — SIGNIFICANT CHANGE UP (ref 13–17)
MAGNESIUM SERPL-MCNC: 2.4 MG/DL — SIGNIFICANT CHANGE UP (ref 1.6–2.6)
MCHC RBC-ENTMCNC: 25.3 PG — LOW (ref 27–34)
MCHC RBC-ENTMCNC: 33.5 GM/DL — SIGNIFICANT CHANGE UP (ref 32–36)
MCV RBC AUTO: 75.7 FL — LOW (ref 80–100)
NRBC # BLD: 0 /100 WBCS — SIGNIFICANT CHANGE UP (ref 0–0)
NRBC # FLD: 0 K/UL — SIGNIFICANT CHANGE UP (ref 0–0)
PHOSPHATE SERPL-MCNC: 3 MG/DL — SIGNIFICANT CHANGE UP (ref 2.5–4.5)
PLATELET # BLD AUTO: 346 K/UL — SIGNIFICANT CHANGE UP (ref 150–400)
POTASSIUM SERPL-MCNC: 3.9 MMOL/L — SIGNIFICANT CHANGE UP (ref 3.5–5.3)
POTASSIUM SERPL-SCNC: 3.9 MMOL/L — SIGNIFICANT CHANGE UP (ref 3.5–5.3)
RBC # BLD: 5.8 M/UL — SIGNIFICANT CHANGE UP (ref 4.2–5.8)
RBC # FLD: 16 % — HIGH (ref 10.3–14.5)
SODIUM SERPL-SCNC: 135 MMOL/L — SIGNIFICANT CHANGE UP (ref 135–145)
WBC # BLD: 7.26 K/UL — SIGNIFICANT CHANGE UP (ref 3.8–10.5)
WBC # FLD AUTO: 7.26 K/UL — SIGNIFICANT CHANGE UP (ref 3.8–10.5)

## 2024-02-10 PROCEDURE — 99239 HOSP IP/OBS DSCHRG MGMT >30: CPT

## 2024-02-10 PROCEDURE — 99232 SBSQ HOSP IP/OBS MODERATE 35: CPT

## 2024-02-10 RX ORDER — METOPROLOL TARTRATE 50 MG
1 TABLET ORAL
Qty: 60 | Refills: 0
Start: 2024-02-10 | End: 2024-03-10

## 2024-02-10 RX ORDER — PANTOPRAZOLE SODIUM 20 MG/1
1 TABLET, DELAYED RELEASE ORAL
Qty: 30 | Refills: 0
Start: 2024-02-10 | End: 2024-03-10

## 2024-02-10 RX ORDER — LOSARTAN POTASSIUM 100 MG/1
1 TABLET, FILM COATED ORAL
Qty: 0 | Refills: 0 | DISCHARGE
Start: 2024-02-10

## 2024-02-10 RX ORDER — METOPROLOL TARTRATE 50 MG
1 TABLET ORAL
Qty: 0 | Refills: 0 | DISCHARGE
Start: 2024-02-10

## 2024-02-10 RX ORDER — PANTOPRAZOLE SODIUM 20 MG/1
1 TABLET, DELAYED RELEASE ORAL
Qty: 0 | Refills: 0 | DISCHARGE
Start: 2024-02-10

## 2024-02-10 RX ORDER — OMEGA-3 ACID ETHYL ESTERS 1 G
1 CAPSULE ORAL
Qty: 0 | Refills: 0 | DISCHARGE

## 2024-02-10 RX ORDER — LOSARTAN POTASSIUM 100 MG/1
1 TABLET, FILM COATED ORAL
Qty: 30 | Refills: 0
Start: 2024-02-10 | End: 2024-03-10

## 2024-02-10 RX ADMIN — LOSARTAN POTASSIUM 25 MILLIGRAM(S): 100 TABLET, FILM COATED ORAL at 06:39

## 2024-02-10 RX ADMIN — TICAGRELOR 90 MILLIGRAM(S): 90 TABLET ORAL at 08:40

## 2024-02-10 RX ADMIN — PANTOPRAZOLE SODIUM 40 MILLIGRAM(S): 20 TABLET, DELAYED RELEASE ORAL at 06:39

## 2024-02-10 RX ADMIN — Medication 81 MILLIGRAM(S): at 11:55

## 2024-02-10 RX ADMIN — Medication 25 MILLIGRAM(S): at 06:39

## 2024-02-10 NOTE — DISCHARGE NOTE PROVIDER - CARE PROVIDERS DIRECT ADDRESSES
[email or web address]

## 2024-02-10 NOTE — DISCHARGE NOTE PROVIDER - ATTENDING DISCHARGE PHYSICAL EXAMINATION:
Pt seen and evaluated at bedside this AM. No o/n events. Denies any SOB/Cp/NV. Feels well. No pain at wrist. Ready to go home.    Gen- In bed, comfortable, nAD  resp- CTAB, good effort.  CVS- RRR, s1S2, no g/r/m.  GI- Soft abd, NT, ND, +BSx4  Ext- No C/C.   Neuro- CN II-XII intact. SPeech fluent/face symmetric.

## 2024-02-10 NOTE — DISCHARGE NOTE PROVIDER - NSDCFUSCHEDAPPT_GEN_ALL_CORE_FT
Constantine Burrows  [facility] Physician [facility]  CARDIOLOGY [id number]  Scheduled Appointment: 04/10/2024

## 2024-02-10 NOTE — PROGRESS NOTE ADULT - SUBJECTIVE AND OBJECTIVE BOX
Patient seen and examined at bedside.    Overnight Events: NAEON    REVIEW OF SYSTEMS:  CONSTITUTIONAL: No weakness, fevers or chills  EYES/ENT: No visual changes;  No dysphagia  NECK: No pain or stiffness  RESPIRATORY: No cough, wheezing, hemoptysis; No shortness of breath  CARDIOVASCULAR: No chest pain or palpitations; No lower extremity edema  GASTROINTESTINAL: No abdominal or epigastric pain. No nausea, vomiting, or hematemesis; No diarrhea or constipation. No melena or hematochezia.  BACK: No back pain  GENITOURINARY: No dysuria, frequency or hematuria  NEUROLOGICAL: No numbness or weakness  SKIN: No itching, burning, rashes, or lesions   All other review of systems is negative unless indicated above.            Current Meds:  acetaminophen     Tablet .. 650 milliGRAM(s) Oral every 6 hours PRN  aluminum hydroxide/magnesium hydroxide/simethicone Suspension 30 milliLiter(s) Oral every 6 hours PRN  aspirin enteric coated 81 milliGRAM(s) Oral daily  atorvastatin 80 milliGRAM(s) Oral at bedtime  losartan 25 milliGRAM(s) Oral daily  metoprolol tartrate 25 milliGRAM(s) Oral two times a day  pantoprazole    Tablet 40 milliGRAM(s) Oral before breakfast  ticagrelor 90 milliGRAM(s) Oral every 12 hours      Vitals:  T(F): 98 (02-09), Max: 98.3 (02-08)  HR: 71 (02-09) (70 - 79)  BP: 121/79 (02-09) (107/72 - 170/128)  RR: 18 (02-09)  SpO2: 100% (02-09)  I&O's Summary    08 Feb 2024 07:01  -  09 Feb 2024 07:00  --------------------------------------------------------  IN: 120 mL / OUT: 250 mL / NET: -130 mL      Physical Exam:  GEN: NAD  HEENT: EOMI, clear sclera  PULM: CTA b/l, no wheeze  CV: RRR S1 S2, no murmur, no JVD  ABD: S, NT, ND  EXT: WWP, no edema  PSYCH: normal affect  SKIN: No rash                          14.5   7.49  )-----------( 359      ( 08 Feb 2024 16:51 )             44.4     02-08    141  |  104  |  13  ----------------------------<  93  3.8   |  26  |  0.86    Ca    9.7      08 Feb 2024 16:51    TPro  7.9  /  Alb  4.4  /  TBili  0.5  /  DBili  x   /  AST  22  /  ALT  24  /  AlkPhos  70  02-08                
Patient seen and examined at bedside.    Overnight Events:     No AEON     Denies any wrist pain, SOB, chest pain, palpitations         Current Meds:  acetaminophen     Tablet .. 650 milliGRAM(s) Oral every 6 hours PRN  aluminum hydroxide/magnesium hydroxide/simethicone Suspension 30 milliLiter(s) Oral every 6 hours PRN  aspirin enteric coated 81 milliGRAM(s) Oral daily  atorvastatin 80 milliGRAM(s) Oral at bedtime  losartan 25 milliGRAM(s) Oral daily  metoprolol tartrate 25 milliGRAM(s) Oral two times a day  pantoprazole    Tablet 40 milliGRAM(s) Oral before breakfast  ticagrelor 90 milliGRAM(s) Oral every 12 hours      Vitals:  T(F): 98.5 (02-10), Max: 99.1 (02-09)  HR: 63 (02-10) (63 - 72)  BP: 120/70 (02-10) (108/65 - 135/82)  RR: 18 (02-10)  SpO2: 100% (02-10)  I&O's Summary      Physical Exam:  Appearance: No acute distress   Cardiovascular: RRR, S1, S2, no murmurs, rubs, or gallops; no edema; no JVD  Respiratory: Clear to auscultation bilaterally  MSK: +2 R radial pulse, no noted hematoma, <2 sec cap refill                           14.7   7.26  )-----------( 346      ( 10 Feb 2024 05:00 )             43.9     02-10    135  |  102  |  16  ----------------------------<  124<H>  3.9   |  22  |  0.83    Ca    9.4      10 Feb 2024 05:00  Phos  3.0     02-10  Mg     2.40     02-10    TPro  7.9  /  Alb  4.4  /  TBili  0.5  /  DBili  x   /  AST  22  /  ALT  24  /  AlkPhos  70  02-08      CARDIAC MARKERS ( 08 Feb 2024 19:45 )  8 ng/L / x     / x     / x     / x     / x      CARDIAC MARKERS ( 08 Feb 2024 16:51 )  11 ng/L / x     / x     / x     / x     / x                  New ECG(s):     Echo:    Stress Testing:     Cath:    Imaging:    Interpretation of Telemetry:

## 2024-02-10 NOTE — DISCHARGE NOTE PROVIDER - PROVIDER TOKENS
PROVIDER:[TOKEN:[[id number]]],PROVIDER:[TOKEN:[[id number]]] PROVIDER:[TOKEN:[[id number]]],PROVIDER:[TOKEN:[[id number]]],FREE:[LAST:[Dr. Kitty Velasco],PHONE:[phone]],FAX:[(   )    -],ADDRESS:[Geriatrician  [address] [place] [address]]]

## 2024-02-10 NOTE — DISCHARGE NOTE PROVIDER - NSDCCPCAREPLAN_GEN_ALL_CORE_FT
PRINCIPAL DISCHARGE DIAGNOSIS  Diagnosis: Chest pain  Assessment and Plan of Treatment: You were found to have a 70% blockage in your LAD. A stent was placed. Continue Aspirin and Brilinta. You will need to follow up with your Cardiologist.      SECONDARY DISCHARGE DIAGNOSES  Diagnosis: Frequent PVCs  Assessment and Plan of Treatment: You were noted to have frequent PVC's on telemetry. Your metoprolol was increased. Follow up with your Cardiologist and Primary Care Doctor.

## 2024-02-10 NOTE — DISCHARGE NOTE PROVIDER - CARE PROVIDER_API CALL
Constantine Burrows  Interventional Cardiology  [address] [place] [address], [address]  [place] [address]  Phone: [phone]  Fax: [phone]  Follow Up Time:     Reza Velasco  Cardiovascular Disease  [address] [place] [address], [place] [address]  Phone: [phone]  Fax: [phone]  Follow Up Time:    Constantine Burrows  Interventional Cardiology  [address] [place] [address], [address]  [place] [address]  Phone: [phone]  Fax: [phone]  Follow Up Time:     Reza Velasco  Cardiovascular Disease  [address] [place] [address], [place] [address]  Phone: [phone]  Fax: [phone]  Follow Up Time:     Dr. Kitty Velasco,   Geriatrician  [address] [place] [address]  Phone: [phone]  Fax: (   )    -  Follow Up Time:

## 2024-02-10 NOTE — DISCHARGE NOTE NURSING/CASE MANAGEMENT/SOCIAL WORK - NSDCPEFALRISK_GEN_ALL_CORE
For information on Fall & Injury Prevention, visit: [email or web address] OR  [email or web address] OR  https://www.cdc.gov/steadi/patient.html

## 2024-02-10 NOTE — DISCHARGE NOTE PROVIDER - NSDCFUADDAPPT_GEN_ALL_CORE_FT
Follow up with your primary care doctor and cardiologist.    You are scheduled for a follow up appointment with Dr. Burrows on 4/10/24.

## 2024-02-10 NOTE — DISCHARGE NOTE PROVIDER - HOSPITAL COURSE
54 y/o Male, with a PmHx of CAD and IW STEMI s/p PCI with 2 stents to mid RCA, HLD, HTN, p/w atypical chest pain and an abnormal stress test as outpatient.    Patient with chest pain that is not triggered by exertion, and chest heaviness that seems to occur randomly. MADHAVI score is low at 77. Also given negative cardiac enzymes, no ST segment changes on EKG, however he did have an abnormal stress test as outpatient. He had an LHC done that showed a mid LAD 70% and had 1 LUANNE placed. LCX and RCA were patent. Ostial RCA 30-40%. He will need to continue Aspirin and Brilinta. He was also started on a PPI.  He was also noted to have frequent PVCs seen on tele and EKG. Metoprolol was up titrated.     He appears comfortable at this time and is now medically cleared for discharge home. Outpatient follow up.

## 2024-02-10 NOTE — PROGRESS NOTE ADULT - PROVIDER SPECIALTY LIST ADULT
-- DO NOT REPLY / DO NOT REPLY ALL --  -- Message is from the [facility]--    General Patient Message      Reason for Call: Patient is scheduled to see Dr. Garcia on 08/23/22.  Patient will need a sign- for the visit.    Caller Information       Type Contact Phone/Fax    07/06/2022 08:49 AM CDT Phone (Incoming) Angeline Arango (Self) [phone] (H)          Alternative phone number: [phone]      Turnaround time given to caller:   \"This message will be sent to [state Provider's name]. The clinical team will fulfill your request as soon as they review your message.\"    
Cardiology
Cardiology

## 2024-02-10 NOTE — PROGRESS NOTE ADULT - ASSESSMENT
53M w/ PMH of CAD, IWSTEMI 10/2023 s/p PCI to RCA x2 presenting with atypical chest pain, not related to exertion. NST w/ fixed defect in the inferior wall that likely corresponds to prior RCA STEMI, although some area of possible viability. LHC showing IFR positive lesion in LAD, s/p PCI.    Recs:  -c/w ASA, brillinta, metop 25 BID  -c/w atorva 80, losart 25  -follow up with Dr. Burrows in 3-4 weeks   -discharge pending attending attestation  53M w/ PMH of CAD, IWSTEMI 10/2023 s/p PCI to RCA x2 presenting with atypical chest pain, not related to exertion. NST w/ fixed defect in the inferior wall that likely corresponds to prior RCA STEMI, although some area of possible viability. LHC showing IFR positive lesion in LAD, s/p PCI.    Recs:  -c/w ASA, brillinta, metop 25 BID  -c/w atorva 80, losart 25  -follow up with Dr. Burrows in 3-4 weeks

## 2024-02-10 NOTE — DISCHARGE NOTE PROVIDER - NSDCMRMEDTOKEN_GEN_ALL_CORE_FT
Aspirin Enteric Coated 81 mg oral delayed release tablet: 1 tab(s) orally once a day  atorvastatin 80 mg oral tablet: 1 tab(s) orally once a day (at bedtime)  Brilinta (ticagrelor) 90 mg oral tablet: 1 tab(s) orally 2 times a day  losartan 25 mg oral tablet: 1 tab(s) orally once a day  metoprolol tartrate 25 mg oral tablet: 1 tab(s) orally 2 times a day  omega-3 polyunsaturated fatty acids 1000 mg oral capsule: 1 cap(s) orally once a day  pantoprazole 40 mg oral delayed release tablet: 1 tab(s) orally once a day (before a meal)

## 2024-02-10 NOTE — DISCHARGE NOTE NURSING/CASE MANAGEMENT/SOCIAL WORK - PATIENT PORTAL LINK FT
You can access the FollowMyHealth Patient Portal offered by [facility] by registering at the following website: [email or web address]. By joining [facility]’s FollowMyHealth portal, you will also be able to view your health information using other applications (apps) compatible with our system.

## 2024-02-20 ENCOUNTER — INPATIENT (INPATIENT)
Facility: HOSPITAL | Age: 54
LOS: 0 days | Discharge: HOME | End: 2024-02-21
Attending: HOSPITALIST | Admitting: HOSPITALIST
Payer: MEDICAID

## 2024-02-20 VITALS
SYSTOLIC BLOOD PRESSURE: 130 MMHG | RESPIRATION RATE: 18 BRPM | TEMPERATURE: 98 F | HEIGHT: 65 IN | HEART RATE: 64 BPM | OXYGEN SATURATION: 98 % | DIASTOLIC BLOOD PRESSURE: 83 MMHG

## 2024-02-20 DIAGNOSIS — R07.9 CHEST PAIN, UNSPECIFIED: ICD-10-CM

## 2024-02-20 LAB
ALBUMIN SERPL ELPH-MCNC: 4.3 G/DL — SIGNIFICANT CHANGE UP (ref 3.3–5)
ALP SERPL-CCNC: 71 U/L — SIGNIFICANT CHANGE UP (ref 40–120)
ALT FLD-CCNC: 26 U/L — SIGNIFICANT CHANGE UP (ref 4–41)
ANION GAP SERPL CALC-SCNC: 9 MMOL/L — SIGNIFICANT CHANGE UP (ref 7–14)
AST SERPL-CCNC: 28 U/L — SIGNIFICANT CHANGE UP (ref 4–40)
BASOPHILS # BLD AUTO: 0.07 K/UL — SIGNIFICANT CHANGE UP (ref 0–0.2)
BASOPHILS NFR BLD AUTO: 1 % — SIGNIFICANT CHANGE UP (ref 0–2)
BILIRUB SERPL-MCNC: 0.3 MG/DL — SIGNIFICANT CHANGE UP (ref 0.2–1.2)
BUN SERPL-MCNC: 13 MG/DL — SIGNIFICANT CHANGE UP (ref 7–23)
CALCIUM SERPL-MCNC: 9.6 MG/DL — SIGNIFICANT CHANGE UP (ref 8.4–10.5)
CHLORIDE SERPL-SCNC: 107 MMOL/L — SIGNIFICANT CHANGE UP (ref 98–107)
CK MB BLD-MCNC: <1.1 % — SIGNIFICANT CHANGE UP (ref 0–2.5)
CK MB CFR SERPL CALC: <1 NG/ML — SIGNIFICANT CHANGE UP
CK SERPL-CCNC: 90 U/L — SIGNIFICANT CHANGE UP (ref 30–200)
CO2 SERPL-SCNC: 24 MMOL/L — SIGNIFICANT CHANGE UP (ref 22–31)
CREAT SERPL-MCNC: 0.9 MG/DL — SIGNIFICANT CHANGE UP (ref 0.5–1.3)
EGFR: 101 ML/MIN/1.73M2 — SIGNIFICANT CHANGE UP
EOSINOPHIL # BLD AUTO: 0.1 K/UL — SIGNIFICANT CHANGE UP (ref 0–0.5)
EOSINOPHIL NFR BLD AUTO: 1.4 % — SIGNIFICANT CHANGE UP (ref 0–6)
GLUCOSE SERPL-MCNC: 94 MG/DL — SIGNIFICANT CHANGE UP (ref 70–99)
HCT VFR BLD CALC: 45.2 % — SIGNIFICANT CHANGE UP (ref 39–50)
HGB BLD-MCNC: 14.8 G/DL — SIGNIFICANT CHANGE UP (ref 13–17)
IANC: 4.56 K/UL — SIGNIFICANT CHANGE UP (ref 1.8–7.4)
IMM GRANULOCYTES NFR BLD AUTO: 0.4 % — SIGNIFICANT CHANGE UP (ref 0–0.9)
LIDOCAIN IGE QN: 32 U/L — SIGNIFICANT CHANGE UP (ref 7–60)
LYMPHOCYTES # BLD AUTO: 1.74 K/UL — SIGNIFICANT CHANGE UP (ref 1–3.3)
LYMPHOCYTES # BLD AUTO: 24 % — SIGNIFICANT CHANGE UP (ref 13–44)
MCHC RBC-ENTMCNC: 25.6 PG — LOW (ref 27–34)
MCHC RBC-ENTMCNC: 32.7 GM/DL — SIGNIFICANT CHANGE UP (ref 32–36)
MCV RBC AUTO: 78.1 FL — LOW (ref 80–100)
MONOCYTES # BLD AUTO: 0.74 K/UL — SIGNIFICANT CHANGE UP (ref 0–0.9)
MONOCYTES NFR BLD AUTO: 10.2 % — SIGNIFICANT CHANGE UP (ref 2–14)
NEUTROPHILS # BLD AUTO: 4.56 K/UL — SIGNIFICANT CHANGE UP (ref 1.8–7.4)
NEUTROPHILS NFR BLD AUTO: 63 % — SIGNIFICANT CHANGE UP (ref 43–77)
NRBC # BLD: 0 /100 WBCS — SIGNIFICANT CHANGE UP (ref 0–0)
NRBC # FLD: 0 K/UL — SIGNIFICANT CHANGE UP (ref 0–0)
NT-PROBNP SERPL-SCNC: 94 PG/ML — SIGNIFICANT CHANGE UP
PLATELET # BLD AUTO: 407 K/UL — HIGH (ref 150–400)
POTASSIUM SERPL-MCNC: 5.6 MMOL/L — HIGH (ref 3.5–5.3)
POTASSIUM SERPL-SCNC: 5.6 MMOL/L — HIGH (ref 3.5–5.3)
PROT SERPL-MCNC: 7.3 G/DL — SIGNIFICANT CHANGE UP (ref 6–8.3)
RBC # BLD: 5.79 M/UL — SIGNIFICANT CHANGE UP (ref 4.2–5.8)
RBC # FLD: 16.1 % — HIGH (ref 10.3–14.5)
SODIUM SERPL-SCNC: 140 MMOL/L — SIGNIFICANT CHANGE UP (ref 135–145)
TROPONIN T, HIGH SENSITIVITY RESULT: 13 NG/L — SIGNIFICANT CHANGE UP
TROPONIN T, HIGH SENSITIVITY RESULT: 15 NG/L — SIGNIFICANT CHANGE UP
WBC # BLD: 7.24 K/UL — SIGNIFICANT CHANGE UP (ref 3.8–10.5)
WBC # FLD AUTO: 7.24 K/UL — SIGNIFICANT CHANGE UP (ref 3.8–10.5)

## 2024-02-20 PROCEDURE — 71275 CT ANGIOGRAPHY CHEST: CPT | Mod: 26,MA

## 2024-02-20 PROCEDURE — 99223 1ST HOSP IP/OBS HIGH 75: CPT | Mod: GC

## 2024-02-20 PROCEDURE — 99285 EMERGENCY DEPT VISIT HI MDM: CPT

## 2024-02-20 PROCEDURE — 71046 X-RAY EXAM CHEST 2 VIEWS: CPT | Mod: 26

## 2024-02-20 RX ORDER — PANTOPRAZOLE SODIUM 20 MG/1
40 TABLET, DELAYED RELEASE ORAL
Refills: 0 | Status: DISCONTINUED | OUTPATIENT
Start: 2024-02-20 | End: 2024-02-21

## 2024-02-20 RX ORDER — METOPROLOL TARTRATE 50 MG
25 TABLET ORAL DAILY
Refills: 0 | Status: DISCONTINUED | OUTPATIENT
Start: 2024-02-20 | End: 2024-02-21

## 2024-02-20 RX ORDER — METOPROLOL TARTRATE 50 MG
25 TABLET ORAL
Refills: 0 | Status: DISCONTINUED | OUTPATIENT
Start: 2024-02-20 | End: 2024-02-20

## 2024-02-20 RX ORDER — ASPIRIN/CALCIUM CARB/MAGNESIUM 324 MG
81 TABLET ORAL ONCE
Refills: 0 | Status: COMPLETED | OUTPATIENT
Start: 2024-02-20 | End: 2024-02-20

## 2024-02-20 RX ORDER — ASPIRIN/CALCIUM CARB/MAGNESIUM 324 MG
81 TABLET ORAL DAILY
Refills: 0 | Status: DISCONTINUED | OUTPATIENT
Start: 2024-02-20 | End: 2024-02-21

## 2024-02-20 RX ORDER — ATORVASTATIN CALCIUM 80 MG/1
80 TABLET, FILM COATED ORAL AT BEDTIME
Refills: 0 | Status: DISCONTINUED | OUTPATIENT
Start: 2024-02-20 | End: 2024-02-21

## 2024-02-20 RX ORDER — TICAGRELOR 90 MG/1
90 TABLET ORAL ONCE
Refills: 0 | Status: COMPLETED | OUTPATIENT
Start: 2024-02-20 | End: 2024-02-20

## 2024-02-20 RX ORDER — TICAGRELOR 90 MG/1
90 TABLET ORAL DAILY
Refills: 0 | Status: DISCONTINUED | OUTPATIENT
Start: 2024-02-21 | End: 2024-02-21

## 2024-02-20 RX ORDER — LOSARTAN POTASSIUM 100 MG/1
25 TABLET, FILM COATED ORAL DAILY
Refills: 0 | Status: DISCONTINUED | OUTPATIENT
Start: 2024-02-20 | End: 2024-02-21

## 2024-02-20 RX ORDER — ISOSORBIDE MONONITRATE 60 MG/1
30 TABLET, EXTENDED RELEASE ORAL DAILY
Refills: 0 | Status: DISCONTINUED | OUTPATIENT
Start: 2024-02-20 | End: 2024-02-21

## 2024-02-20 RX ADMIN — Medication 81 MILLIGRAM(S): at 16:25

## 2024-02-20 RX ADMIN — TICAGRELOR 90 MILLIGRAM(S): 90 TABLET ORAL at 21:16

## 2024-02-20 NOTE — CONSULT NOTE ADULT - NS ATTEND AMEND GEN_ALL_CORE FT
personally saw and examined patient  labs and vitals reviewed  agree with above assessment and plan  low suspicion for acs here  CE not consistent   pt comfortable appearing  agree with uptitration of antianginal regimen  will follow

## 2024-02-20 NOTE — H&P ADULT - PROBLEM SELECTOR PLAN 5
Patient with history of hypertension   -continue home losartan   -change home metoprolol tartrate to metoprolol succinate 25mg daily

## 2024-02-20 NOTE — H&P ADULT - NSHPLABSRESULTS_GEN_ALL_CORE
LABS:  02-20 @ 21:49 Creatine 90 U/L [30 - 200]                          14.8   7.24  )-----------( 407      ( 20 Feb 2024 15:42 )             45.2     02-20    140  |  107  |  13  ----------------------------<  94  5.6<H>   |  24  |  0.90    Ca    9.6      20 Feb 2024 15:42    TPro  7.3  /  Alb  4.3  /  TBili  0.3  /  DBili  x   /  AST  28  /  ALT  26  /  AlkPhos  71  02-20    LIVER FUNCTIONS - ( 20 Feb 2024 15:42 )  Alb: 4.3 g/dL / Pro: 7.3 g/dL / ALK PHOS: 71 U/L / ALT: 26 U/L / AST: 28 U/L / GGT: x               CARDIAC MARKERS ( 20 Feb 2024 21:49 )  x     / x     / 90 U/L / x     / <1.0 ng/mL        Urinalysis Basic - ( 20 Feb 2024 15:42 )    Color: x / Appearance: x / SG: x / pH: x  Gluc: 94 mg/dL / Ketone: x  / Bili: x / Urobili: x   Blood: x / Protein: x / Nitrite: x   Leuk Esterase: x / RBC: x / WBC x   Sq Epi: x / Non Sq Epi: x / Bacteria: x .    -Personally INTERPRETED EKG: NSR, 66bpm, qtc 452, occasional PVCs, no acute Tw or ST changes       -Personally INTERPRETED CXR: lungs clear, no pleural effusions, no PTX      -Personally reviewed the following labs below:      02-20 @ 21:49 Creatine 90 U/L [30 - 200]                          14.8   7.24  )-----------( 407      ( 20 Feb 2024 15:42 )             45.2     02-20    140  |  107  |  13  ----------------------------<  94  5.6<H>   |  24  |  0.90    Ca    9.6      20 Feb 2024 15:42    TPro  7.3  /  Alb  4.3  /  TBili  0.3  /  DBili  x   /  AST  28  /  ALT  26  /  AlkPhos  71  02-20    LIVER FUNCTIONS - ( 20 Feb 2024 15:42 )  Alb: 4.3 g/dL / Pro: 7.3 g/dL / ALK PHOS: 71 U/L / ALT: 26 U/L / AST: 28 U/L / GGT: x           CARDIAC MARKERS ( 20 Feb 2024 21:49 )  x     / x     / 90 U/L / x     / <1.0 ng/mL    Urinalysis Basic - ( 20 Feb 2024 15:42 )  Color: x / Appearance: x / SG: x / pH: x  Gluc: 94 mg/dL / Ketone: x  / Bili: x / Urobili: x   Blood: x / Protein: x / Nitrite: x   Leuk Esterase: x / RBC: x / WBC x   Sq Epi: x / Non Sq Epi: x / Bacteria: x    -Personally reviewed: CT ANGIO CHEST PULM ART [facility] ORDERED BY: MINDA MENDOZA    PROCEDURE DATE: 02/20/2024  INTERPRETATION: CLINICAL INFORMATION: Chest pain. Evaluate for pulmonary embolism.  PROCEDURE:  CT Angiogram of the chest was obtained with intravenous contrast. Three dimensional maximum intensity projection (MIP) images were generated.  FINDINGS:  PULMONARY ANGIOGRAM: No pulmonary embolism in the main, left main, right main, lobar, segmental, or subsegmental pulmonary arteries.  LYMPH NODES: No lymphadenopathy.  HEART/VASCULATURE: Heart size is normal. No pericardial effusion. Coronary artery calcifications.  AIRWAYS/LUNGS/PLEURA: Patent central airways. Bilateral lower lobe subsegmental atelectasis. No pleural effusion.  UPPER ABDOMEN: Partially visualized mesenteric stranding and mildly enlarged lymph nodes .  BONES/SOFT TISSUES: Bones are unremarkable. Soft tissues are unremarkable.  IMPRESSION:  No pulmonary embolism.  Partially visualized mesenteric stranding and mildly enlarged lymph nodes can be seen in sclerosing mesenteritis.

## 2024-02-20 NOTE — ED PROVIDER NOTE - OBJECTIVE STATEMENT
53-year-old male PMH CAD (x 4 stents) presenting with chest pain  Patient states 3 weeks ago he got his last stent before then he was having chest pain which radiated to his hands.  Symptoms were not relieved after stenting patient describes chest pain as pressure on the left side of his chest which radiates to his and stating feels his heart is having.  Patient states in the past few days of the pain is getting worse aggravated by laying supine.  Patient endorses night sweats weakness.  Catheterization which was done 3 weeks ago was done by Constantine Burrows.  Patient also endorses shortness of breath on ambulation.  Patient denies vision changes or focal neurological symptoms gait ataxia saddle anesthesia abdominal pain nausea vomiting constipation fevers or chills.

## 2024-02-20 NOTE — CONSULT NOTE ADULT - SUBJECTIVE AND OBJECTIVE BOX
CHIEF COMPLAINT:    HISTORY OF PRESENT ILLNESS:      Allergies    No Known Allergies    Intolerances    	    MEDICATIONS:                  PAST MEDICAL & SURGICAL HISTORY:  Hypertension      CAD (coronary artery disease)      STEMI (ST elevation myocardial infarction)      No significant past surgical history          FAMILY HISTORY:  Family history of heart attack (Father)        SOCIAL HISTORY:    [ ] Non-smoker  [ ] Smoker  [ ] Alcohol  [ ] Denies Alcohol      REVIEW OF SYSTEMS:  CONSTITUTIONAL: no fevers or chills  EYES/ENT: No visual changes; No vertigo or throat pain  NECK: No JVD  RESPIRATORY:  No cough, wheezing, chills or hemoptysis, SOB  CARDIOVASCULAR: No chest pain, palpitations, passing out, dizziness, or leg swelling  GASTROINTESTINAL: No abdominal or epigastric pain. No nausea/vomiting/melena  Genitourinary: No dysuria, frequency or hematuria  NEUROLOGICAL: No numbness or weakness  SKIN: No itching, burning, rashes or lesions      PHYSICAL EXAM:  T(C): 36.8 (02-20-24 @ 20:53), Max: 36.8 (02-20-24 @ 15:45)  HR: 62 (02-20-24 @ 20:53) (57 - 64)  BP: 117/81 (02-20-24 @ 20:53) (113/90 - 130/83)  RR: 18 (02-20-24 @ 20:53) (17 - 18)  SpO2: 100% (02-20-24 @ 20:53) (98% - 100%)  Wt(kg): --  ICU Vital Signs Last 24 Hrs  T(C): 36.8 (20 Feb 2024 20:53), Max: 36.8 (20 Feb 2024 15:45)  T(F): 98.2 (20 Feb 2024 20:53), Max: 98.3 (20 Feb 2024 18:55)  HR: 62 (20 Feb 2024 20:53) (57 - 64)  BP: 117/81 (20 Feb 2024 20:53) (113/90 - 130/83)  BP(mean): 98 (20 Feb 2024 15:45) (98 - 98)  ABP: --  ABP(mean): --  RR: 18 (20 Feb 2024 20:53) (17 - 18)  SpO2: 100% (20 Feb 2024 20:53) (98% - 100%)    O2 Parameters below as of 20 Feb 2024 20:53  Patient On (Oxygen Delivery Method): room air          I&O's Summary        Appearance: Normal	  HEENT:   Normal oral mucosa	  Cardiovascular: Normal S1 S2, No JVD, No murmurs, No edema  Respiratory: Lungs clear to auscultation	  Psychiatry: A & O x 3, Mood & affect appropriate  Gastrointestinal:  Soft, Non-tender, + BS	  Skin: No rashes, No ecchymoses, No cyanosis	  Neurologic: Non-focal  Extremities: Warm and well perfused. 2+ pulses bilaterally        LABS:	 	    CBC Full  -  ( 20 Feb 2024 15:42 )  WBC Count : 7.24 K/uL  Hemoglobin : 14.8 g/dL  Hematocrit : 45.2 %  Platelet Count - Automated : 407 K/uL  Mean Cell Volume : 78.1 fL  Mean Cell Hemoglobin : 25.6 pg  Mean Cell Hemoglobin Concentration : 32.7 gm/dL  Auto Neutrophil # : 4.56 K/uL  Auto Lymphocyte # : 1.74 K/uL  Auto Monocyte # : 0.74 K/uL  Auto Eosinophil # : 0.10 K/uL  Auto Basophil # : 0.07 K/uL  Auto Neutrophil % : 63.0 %  Auto Lymphocyte % : 24.0 %  Auto Monocyte % : 10.2 %  Auto Eosinophil % : 1.4 %  Auto Basophil % : 1.0 %    02-20    140  |  107  |  13  ----------------------------<  94  5.6<H>   |  24  |  0.90    Ca    9.6      20 Feb 2024 15:42    TPro  7.3  /  Alb  4.3  /  TBili  0.3  /  DBili  x   /  AST  28  /  ALT  26  /  AlkPhos  71  02-20      proBNP:   Lipid Profile:   HgA1c:   TSH:     CARDIAC MARKERS:            Creatine Kinase, Serum: 90 U/L (02-20-24 @ 21:49)      TELEMETRY: 	    ECG:  	    PREVIOUS DIAGNOSTIC TESTING:    [ ] Echocardiogram:  [ ]  Catheterization:  [ ] Stress Test:  	   CHIEF COMPLAINT: Chest heaviness    HISTORY OF PRESENT ILLNESS:  Patient is a 53 y M with a PMHx of CAD (IW STEMI in Oct 23 s/p PCI w/ 2 stents to mid RCA, and recent presentation on 2/9 found have 70% stenosis of LAD w/ stent placement) presenting today with chest pain/heaviness that has been ongoing for the last 2-3 weeks. Patient notes that mostly the sensation is a heaviness and not so much pain and that it is associated with palpitations/flutters. He notes that the heaviness he feels is localized to his left chest and denies any radiation. He notes that the sensation has been getting worse and that it is aggravated by laying supine. He also notes associated generalized weakness and lightheadedness over the last 2-3 weeks. Patient also notes that today morning he felt SOB with exertion but that has since resolved. He denies any association with food intake.     ED course- cardiac enzymes negative, SCr wnl, proBNP wnl  EKG- NSR with occasional PAC and PVC, evidence of previous infarct but no acute dynamic changes between 2 EKGs obtained in ED    Allergies  No Known Allergies    	    MEDICATIONS:  Metoprolol tartrate 25 mg  Lipitor 80 mg daily  ASA 81 mg daily  Brilinta 90 mg BID  Pantoprazole 40 mg  Omega 3                 PAST MEDICAL & SURGICAL HISTORY:  Hypertension      CAD (coronary artery disease)      STEMI (ST elevation myocardial infarction)      No significant past surgical history          FAMILY HISTORY:  Family history of heart attack (Father)        SOCIAL HISTORY:    [ ] Non-smoker  [ ] Smoker  [ ] Alcohol  [ ] Denies Alcohol      REVIEW OF SYSTEMS:  CONSTITUTIONAL: no fevers or chills  EYES/ENT: No visual changes; No vertigo or throat pain  NECK: No JVD  RESPIRATORY:  No cough, wheezing, chills or hemoptysis, SOB  CARDIOVASCULAR: No chest pain, palpitations, passing out, dizziness, or leg swelling  GASTROINTESTINAL: No abdominal or epigastric pain. No nausea/vomiting/melena  Genitourinary: No dysuria, frequency or hematuria  NEUROLOGICAL: No numbness or weakness  SKIN: No itching, burning, rashes or lesions      PHYSICAL EXAM:  T(C): 36.8 (02-20-24 @ 20:53), Max: 36.8 (02-20-24 @ 15:45)  HR: 62 (02-20-24 @ 20:53) (57 - 64)  BP: 117/81 (02-20-24 @ 20:53) (113/90 - 130/83)  RR: 18 (02-20-24 @ 20:53) (17 - 18)  SpO2: 100% (02-20-24 @ 20:53) (98% - 100%)  Wt(kg): --  ICU Vital Signs Last 24 Hrs  T(C): 36.8 (20 Feb 2024 20:53), Max: 36.8 (20 Feb 2024 15:45)  T(F): 98.2 (20 Feb 2024 20:53), Max: 98.3 (20 Feb 2024 18:55)  HR: 62 (20 Feb 2024 20:53) (57 - 64)  BP: 117/81 (20 Feb 2024 20:53) (113/90 - 130/83)  BP(mean): 98 (20 Feb 2024 15:45) (98 - 98)  ABP: --  ABP(mean): --  RR: 18 (20 Feb 2024 20:53) (17 - 18)  SpO2: 100% (20 Feb 2024 20:53) (98% - 100%)    O2 Parameters below as of 20 Feb 2024 20:53  Patient On (Oxygen Delivery Method): room air          I&O's Summary        Appearance: Normal	  HEENT:   Normal oral mucosa	  Cardiovascular: Normal S1 S2, No JVD, No murmurs, No edema  Respiratory: Lungs clear to auscultation	  Psychiatry: A & O x 3, Mood & affect appropriate  Gastrointestinal:  Soft, Non-tender, + BS	  Skin: No rashes, No ecchymoses, No cyanosis	  Neurologic: Non-focal  Extremities: Warm and well perfused. 2+ pulses bilaterally        LABS:	 	    CBC Full  -  ( 20 Feb 2024 15:42 )  WBC Count : 7.24 K/uL  Hemoglobin : 14.8 g/dL  Hematocrit : 45.2 %  Platelet Count - Automated : 407 K/uL  Mean Cell Volume : 78.1 fL  Mean Cell Hemoglobin : 25.6 pg  Mean Cell Hemoglobin Concentration : 32.7 gm/dL  Auto Neutrophil # : 4.56 K/uL  Auto Lymphocyte # : 1.74 K/uL  Auto Monocyte # : 0.74 K/uL  Auto Eosinophil # : 0.10 K/uL  Auto Basophil # : 0.07 K/uL  Auto Neutrophil % : 63.0 %  Auto Lymphocyte % : 24.0 %  Auto Monocyte % : 10.2 %  Auto Eosinophil % : 1.4 %  Auto Basophil % : 1.0 %    02-20    140  |  107  |  13  ----------------------------<  94  5.6<H>   |  24  |  0.90    Ca    9.6      20 Feb 2024 15:42    TPro  7.3  /  Alb  4.3  /  TBili  0.3  /  DBili  x   /  AST  28  /  ALT  26  /  AlkPhos  71  02-20      proBNP:   Lipid Profile:   HgA1c:   TSH:     CARDIAC MARKERS:  Troponin 13-> 15  Creatine Kinase, Serum: 90 U/L (02-20-24 @ 21:49)            PREVIOUS DIAGNOSTIC TESTING:    [ x ] Echocardiogram:  TTE W or WO Ultrasound Enhancing Agent (10.05.23 @ 06:50)  CONCLUSIONS:   1. Left ventricular cavity is normally sized. Left ventricular wall thickness is normal. Left ventricular systolic function is normal with an ejection fraction of 64 % by 3D. There are no regional wall motion abnormalities seen.   2. Normal left ventricular diastolic function.   3. Normal right ventricular cavity size, wall thickness and systolic function.   4. Normal atria.   5. No significant valvular disease.   6. There is trace tricuspid regurgitation. There is insufficient tricuspid regurgitation detected to calculate pulmonary artery systolic pressure.   7. No pericardial effusion seen.    [ ]  Catheterization:    [ ] Stress Test:  	   CHIEF COMPLAINT: Chest heaviness    HISTORY OF PRESENT ILLNESS:  Patient is a 53 y M with a PMHx of CAD (IW STEMI in Oct 23 s/p PCI w/ 2 stents to mid RCA, and recent presentation on 2/9 found have 70% stenosis of LAD w/ stent placement) presenting today with chest pain/heaviness that has been ongoing for the last 2-3 weeks. Patient notes that mostly the sensation is a heaviness and not so much pain and that it is associated with palpitations/flutters. He notes that the heaviness he feels is localized to his left chest and denies any radiation. He notes that the sensation has been getting worse and that it is aggravated by laying supine. He also notes associated generalized weakness and lightheadedness over the last 2-3 weeks. Patient also notes that today morning he felt SOB with exertion but that has since resolved. He denies any association with food intake.     ED course- cardiac enzymes negative, SCr wnl, proBNP wnl  EKG- NSR with occasional PAC and PVC, evidence of previous infarct but no acute dynamic changes between 2 EKGs obtained in ED    Allergies  No Known Allergies    	    MEDICATIONS:  Metoprolol tartrate 25 mg  Lipitor 80 mg daily  ASA 81 mg daily  Brilinta 90 mg BID  Pantoprazole 40 mg  Omega 3                 PAST MEDICAL & SURGICAL HISTORY:  Hypertension      CAD (coronary artery disease)      STEMI (ST elevation myocardial infarction)      No significant past surgical history          FAMILY HISTORY:  Family history of heart attack (Father)        SOCIAL HISTORY:    [ ] Non-smoker  [ ] Smoker  [ ] Alcohol  [ ] Denies Alcohol      REVIEW OF SYSTEMS:  CONSTITUTIONAL: no fevers or chills  EYES/ENT: No visual changes; No vertigo or throat pain  NECK: No JVD  RESPIRATORY:  No cough, wheezing, chills or hemoptysis, SOB  CARDIOVASCULAR: No chest pain, palpitations, passing out, dizziness, or leg swelling  GASTROINTESTINAL: No abdominal or epigastric pain. No nausea/vomiting/melena  Genitourinary: No dysuria, frequency or hematuria  NEUROLOGICAL: No numbness or weakness  SKIN: No itching, burning, rashes or lesions      PHYSICAL EXAM:  T(C): 36.8 (02-20-24 @ 20:53), Max: 36.8 (02-20-24 @ 15:45)  HR: 62 (02-20-24 @ 20:53) (57 - 64)  BP: 117/81 (02-20-24 @ 20:53) (113/90 - 130/83)  RR: 18 (02-20-24 @ 20:53) (17 - 18)  SpO2: 100% (02-20-24 @ 20:53) (98% - 100%)  Wt(kg): --  ICU Vital Signs Last 24 Hrs  T(C): 36.8 (20 Feb 2024 20:53), Max: 36.8 (20 Feb 2024 15:45)  T(F): 98.2 (20 Feb 2024 20:53), Max: 98.3 (20 Feb 2024 18:55)  HR: 62 (20 Feb 2024 20:53) (57 - 64)  BP: 117/81 (20 Feb 2024 20:53) (113/90 - 130/83)  BP(mean): 98 (20 Feb 2024 15:45) (98 - 98)  ABP: --  ABP(mean): --  RR: 18 (20 Feb 2024 20:53) (17 - 18)  SpO2: 100% (20 Feb 2024 20:53) (98% - 100%)    O2 Parameters below as of 20 Feb 2024 20:53  Patient On (Oxygen Delivery Method): room air          I&O's Summary        Appearance: Normal	  HEENT:   Normal oral mucosa	  Cardiovascular: Normal S1 S2, No JVD, No murmurs, No edema  Respiratory: Lungs clear to auscultation	  Psychiatry: A & O x 3, Mood & affect appropriate  Gastrointestinal:  Soft, Non-tender, + BS	  Skin: No rashes, No ecchymoses, No cyanosis	  Neurologic: Non-focal  Extremities: Warm and well perfused. 2+ pulses bilaterally        LABS:	 	    CBC Full  -  ( 20 Feb 2024 15:42 )  WBC Count : 7.24 K/uL  Hemoglobin : 14.8 g/dL  Hematocrit : 45.2 %  Platelet Count - Automated : 407 K/uL  Mean Cell Volume : 78.1 fL  Mean Cell Hemoglobin : 25.6 pg  Mean Cell Hemoglobin Concentration : 32.7 gm/dL  Auto Neutrophil # : 4.56 K/uL  Auto Lymphocyte # : 1.74 K/uL  Auto Monocyte # : 0.74 K/uL  Auto Eosinophil # : 0.10 K/uL  Auto Basophil # : 0.07 K/uL  Auto Neutrophil % : 63.0 %  Auto Lymphocyte % : 24.0 %  Auto Monocyte % : 10.2 %  Auto Eosinophil % : 1.4 %  Auto Basophil % : 1.0 %    02-20    140  |  107  |  13  ----------------------------<  94  5.6<H>   |  24  |  0.90    Ca    9.6      20 Feb 2024 15:42    TPro  7.3  /  Alb  4.3  /  TBili  0.3  /  DBili  x   /  AST  28  /  ALT  26  /  AlkPhos  71  02-20      proBNP:   Lipid Profile:   HgA1c:   TSH:     CARDIAC MARKERS:  Troponin 13-> 15  Creatine Kinase, Serum: 90 U/L (02-20-24 @ 21:49)            PREVIOUS DIAGNOSTIC TESTING:    [ x ] Echocardiogram:  TTE W or WO Ultrasound Enhancing Agent (10.05.23 @ 06:50)  CONCLUSIONS:   1. Left ventricular cavity is normally sized. Left ventricular wall thickness is normal. Left ventricular systolic function is normal with an ejection fraction of 64 % by 3D. There are no regional wall motion abnormalities seen.   2. Normal left ventricular diastolic function.   3. Normal right ventricular cavity size, wall thickness and systolic function.   4. Normal atria.   5. No significant valvular disease.   6. There is trace tricuspid regurgitation. There is insufficient tricuspid regurgitation detected to calculate pulmonary artery systolic pressure.   7. No pericardial effusion seen.    [ x ]  Catheterization:  Cardiac Catheterization (02.09.24 @ 15:15)  Diagnostic Findings:     Coronary Angiography   The coronary circulation is right dominant. Cardiac catheterization  was performed urgently.    LM   Left main artery: Normal .      LAD   Proximal left anterior descending: There is a 20 % stenosis.    Mid left anterior descending: There is a 60 % stenosis in the proximal  third portion of the segment. TESS Flow 3.  Mid left anterior descending: There is a 50 % stenosis in the middle  third portion of the segment. TESS Flow 3. Instant wave-free  ratio was performed with a calculated value of 0.76. Based on the  results, the lesion was judged to be significant and an  intervention was performed.    First diagonal: There is a 30 % stenosis in the ostium portion of the  segment.    CX   Circumflex: Angiography shows minor irregularities.      RCA   Proximal right coronary artery: There is a 20 % stenosis in the  proximal third portion of the segment within the stented  segment. The previously placed stent is a drug-eluting stent and is  patent.  Mid right coronary artery: There is a 20 % stenosis in the middle  third portion of the segment within the stented segment. Right  posterior descending artery: There is a 30 % stenosis in the proximal  third portion of the segment.        Cardiac Catheterization (10.04.23 @ 19:23)  Diagnostic Findings:     Coronary Angiography   The coronary circulation is right dominant. Cardiac catheterization  was performed emergently.    LM   Left main artery: Normal.      LAD   Mid left anterior descending: The segment is small to medium sized.  There is a 60 % stenosis in the proximal third portion of  the segment. TESS Flow 3.    Distal left anterior descending: There is a 20 % stenosis in the  proximal third portion of the segment.    CX   Circumflex: Angiography shows minor irregularities.      RCA   Proximal right coronary artery: There is a 60 % stenosis in the middle  third portion of the segment. TESS Flow 3.  Mid right coronary artery: The distal vessel is supplied by limited  collaterals from the Distal circumflex. There is a 100 %  stenosis in the middle third portion of the segment. TESS Flow 0.      Left Heart Cath   Left ventricular function was assessed. Global left ventricular  function is mildly depressed. Ejection fraction was visually  estimated with a value of 40%.        [ x ] Stress Test:  	  Nuclear Stress Test-Exercise.. (02.08.24 @ 11:52)  Conclusions:   1. Myocardial Perfusion: the patient had chest heaviness and palpitations throughout the test. Upon completion he was sent to ED.   2. Baseline electrocardiogram: normal sinus rhythm at a rate of 80 bpm with frequent premature ventricular contractions, no sigificantST abnomalities and evidence of prior inferior infarct.   3. Qualitative Perfusion:      - small-sized, severe defect(s) in the inferior wall, doesn't normalize with prone imaging suggestive of an infarct with possible viability.   4. The left ventricle is normal in function and normal in size. Normal left ventricular diastolic function. The resting left ventricular EF is 68 %. The resting end diastolic volume is 100 ml and systolic volume is 32 ml.   5. Imaging: Tracer injection was done at rest, during period of chest pain.   6. Low normal right ventricular function. There is no right ventricular dilation. RVEF 48%; RVEDV 114 mL; RVESV 59 mL.   7. Hypokinesis of the proximal to mid wall.   8. Gated SPECT: Give evidence of some preserved inferior wall motion and thickening, findings suggest viable myocardium in RCA distribution.

## 2024-02-20 NOTE — H&P ADULT - HISTORY OF PRESENT ILLNESS
Patient is a 53 y M with a PMHx of CAD (IW STEMI in Oct 23 s/p PCI w/ 2 stents to mid RCA, and recent presentation on 2/9 found have 70% stenosis of LAD w/ stent placement) presenting today with chest pain/heaviness that has been ongoing for the last 2-3 weeks. Patient notes that mostly the sensation is a heaviness and not so much pain and that it is associated with palpitations/flutters. He notes that the heaviness he feels is localized to his left chest and denies any radiation. He notes that the sensation has been getting worse and that it is aggravated by laying supine. He also notes associated generalized weakness and lightheadedness over the last 2-3 weeks. Patient also notes that today morning he felt SOB with exertion but that has since resolved. He denies any association with food intake.  Patient is a 52yo Male with MHx of CAD (IW STEMI in Oct 23 s/p PCI w/ 2 stents to mid RCA, and recent presentation on 2/9 found have 70% stenosis of LAD w/ stent placement) presenting today with chest pain/heaviness that has been ongoing for the last 2-3 weeks. Patient notes that mostly the sensation is a heaviness and not so much pain and that it is associated with palpitations/flutters. He notes that the heaviness he feels is localized to his left chest and denies any radiation. He notes that the sensation has been getting worse and that it is aggravated by laying supine. He also notes associated generalized weakness and lightheadedness over the last 2-3 weeks. Patient also notes that today morning he felt SOB with exertion but that has since resolved. He denies any association with food intake.  54yo Male with MHx of CAD (IW STEMI in Oct 23 s/p PCI w/ 2 stents to mid RCA, and recent presentation on 2/9 found have 70% stenosis of LAD w/ stent placement) presenting today with chest pain/heaviness that has been ongoing for the last 2-3 weeks. Patient notes that mostly the sensation is a heaviness and not so much pain and that it is associated with palpitations/flutters. He notes that the heaviness he feels is localized to his left chest and denies any radiation. He notes that the sensation has been getting worse and that it is aggravated by laying supine. He also notes associated generalized weakness and lightheadedness over the last 2-3 weeks. Patient also notes that today morning he felt SOB with exertion but that has since resolved. He denies any association with food intake.

## 2024-02-20 NOTE — H&P ADULT - TIME BILLING
Reviewed admission imaging reports, and admission labs prior to the encounter with  the patient   Reviewed Triage and ED course/ documentation   Reviewed consultants notes, including::: Cardiology, 2/10/2024 by Marito Sierra  : NST w/ fixed defect in the inferior wall that likely corresponds to prior RCA STEMI, although some area of possible viability. LHC showing IFR positive lesion in LAD, s/p PCI.  Performed full physical exam and ROS  Obtained list of home medications and performed home medications reconciliation on EMR  Discussed prognosis, treatment and further cardiologic w/up with the patient  Ordered or reviewed  new admission medications and placed or reviewed all hospitalization admission orders  Managed (continued, held or adjusted doses) home medications   Ordered  or reviewed orders of  new imaging and new lab w/up  Requested new consultations including::: Cardiology

## 2024-02-20 NOTE — H&P ADULT - ASSESSMENT
Patient is a 53 y M with a PMHx of CAD (IW STEMI in Oct 23 s/p PCI w/ 2 stents to mid RCA, and recent presentation on 2/9 found have 70% stenosis of LAD w/ stent placement) presenting today with chest pain/heaviness that has been ongoing for the last 2-3 weeks. Given patient's presentation, suspicion for ACS is low at this time and is likely anginal pain. Patient admitted for further monitoring on telemetry.

## 2024-02-20 NOTE — H&P ADULT - PROBLEM SELECTOR PLAN 3
Patient with history of CAD s/p 4 stents   -continue DAPT, ASA 81 daily and Brilinta 90 BID   -continue atorvastatin

## 2024-02-20 NOTE — CONSULT NOTE ADULT - ASSESSMENT
Patient is a 53 y M with a PMHx of CAD (IW STEMI in Oct 23 s/p PCI w/ 2 stents to mid RCA, and recent presentation on 2/9 found have 70% stenosis of LAD w/ stent placement) presenting today with chest pain/heaviness that has been ongoing for the last 2-3 weeks. Cardiology consulted for patient chest heaviness iso recent stent placement. Patient VSS and without acute ischemic changes on EKG. Cardiac enzymes negative and low suspicion for ACS, However, given patient endorsing palpitations and continued chest heaviness, he will likely benefit from long acting blockade as well as addition of nitrate for anginal relief. Patient does not require urgent intervention overnight as patient hemodynamically stable at present.     PLAN:  #Elevated troponin, concern for NSTEMI  - Continue telemetry monitoring  - Troponin 13-> 15, CK-MB <1, CK 90 , CPK <1  - Continue DAPT- ASA 81 mg daily, Brilinta 90 mg BID  - Continue Lipitor 80 mg daily  - Recommend switching Metoprolol tartrate 25 mg to Metoprolol succinate 25 mg daily   - Recommend starting Isosorbide mononitrate 30 mg daily for anginal prevention  - Serial EKGs PRN for chest pain  - No need for repeat TTE at this time- previous echo 10/2023 with EF 64% and normal LV and RV function  - Cardiology to follow      Case discussed with cardiology fellow Reji Zimmerman MD  Should patient HD status change, please call cardiology at #[phone] once again for further assistance.  Note Incomplete, Attending attestation to follow for final recommendations

## 2024-02-20 NOTE — H&P ADULT - PROBLEM SELECTOR PLAN 6
DVT ppx: patient able to ambulate   Diet: DASH   Dispo: monitor on telemetry, appreciate card recs and stabilize for discharge home

## 2024-02-20 NOTE — ED ADULT TRIAGE NOTE - CHIEF COMPLAINT QUOTE
Pt c/o L sided chest pain x 3 weeks worsening over the last 2 days. Pt s/p cardiac stent 3 weeks ago. Also c/o palpitations, weakness. PHx CAD

## 2024-02-20 NOTE — H&P ADULT - REASON FOR ADMISSION
chest pain/heaviness s/p LAD stent placement on 2/9 Chest pain/, recent LAD stent placement Chest pain, recent LAD stent placement

## 2024-02-20 NOTE — H&P ADULT - PROBLEM SELECTOR PROBLEM 3
Patient's son Heriberto Hernandez notified of surgery arrival time. Patient is to be at [address] [place] [address] Drive day surgery by  1:30pm   on  05-. Patient reminded to have nothing to eat or drink after midnight. Patient voiced understanding. CAD (coronary artery disease)

## 2024-02-20 NOTE — H&P ADULT - NSHPREVIEWOFSYSTEMS_GEN_ALL_CORE
REVIEW OF SYSTEMS:  CONSTITUTIONAL: + weakness and mild lightheadedness at time, no syncope, no fevers, chills, sick contacts, or unintended weight loss  EYES: No visual changes or vertigo, stable poor vision in right eye and history of dysconjugate gaze   ENT: No throat pain, rhinorrhea, or hearing loss   NECK: No pain or stiffness  RESPIRATORY: No cough, wheezing, hemoptysis; + episode of SOB morning of admission, no longer experiencing   CARDIOVASCULAR: endorse palpitations, chest heaviness/pain, non radiating   GASTROINTESTINAL: No abdominal or epigastric pain. No nausea, vomiting, or hematemesis; No diarrhea or constipation. No melena or hematochezia.  GENITOURINARY: No dysuria, frequency or hematuria  SKIN: No itching, rashes, or bruises

## 2024-02-20 NOTE — ED PROVIDER NOTE - ATTENDING CONTRIBUTION TO CARE
Pt was seen and evaluated by me. Pt is a 54 y/o male with PMHx of CAD s/p 4 stents and HTN who presented ot the ED for chest pain X 2 wks. Pt notes over the past 2 wks after recent stent having return of chest pressure. Pt denies any fever, chills, nausea, vomiting, SOB, or abd pain.   VITALS: Vitals have been reviewed.  GEN APPEARANCE: Alert and cooperative, non-toxic appearing and in NAD  HEAD: Atraumatic, normocephalic.   EYES: PERRL, EOMI.   EARS: Gross hearing intact.   NOSE: No nasal discharge.   THROAT: MMM. Oral cavity and pharynx normal.   CV: RRR, S1S2, no c/r/m/g. No cyanosis or pallor.   LUNGS: CTAB. No wheezing. No rales. No rhonchi. No diminished breath sounds.   ABDOMEN: Soft, NTND. No guarding or rebound.   MSK/EXT: Spine appears normal, no spine point tenderness. No calf tenderness or swelling.   NEURO: Alert, follows commands. Speech normal. Sensation and motor normal x4 extremities.   SKIN: Normal color for race, warm, dry and intact. No evidence of rash.  54 y/o male with PMHx of CAD s/p 4 stents and HTN who presented ot the ED for chest pain X 2 wks.   Concern for ACS, Musculoskeletal  Labs, EKG, CXR, Analgesia

## 2024-02-20 NOTE — ED ADULT NURSE NOTE - OBJECTIVE STATEMENT
53 year old male, received to spot 9. A&Ox4, ambulatory. Respirations equal and unlabored. Past medical history of STEMI, CAD, HTN. Pt on daily aspirin. Pt c/o left sided intermittent chest pain x3 weeks. Pt states pain got worse so he came to the ED. Pt denies SOB, palpitations, dizziness, blurry vision, headache, numbness, fevers, chills, any other complaints. Neuro intact. PERRLA. Skin dry and intact. Pt placed on cardiac monitor, normal sinus to sinus bradycardiac. EKG performed and placed in chart. 18G IV placed to L AC. Labs obtained. Pending CT. No acute distress noted. Safety maintained, bed in lowest position, side rails raised, call bell in reach.

## 2024-02-20 NOTE — H&P ADULT - PROBLEM SELECTOR PLAN 1
Patient presenting with chest pain that is worse with rest and not aggravated by exertion. Pain is described as a chest heaviness.   -cardiac enzymes (trops and CKMB) negative   -EKG with no ST segment changes   -patient's cardiac history makes him high risk for ACS, unlikely ACS at this time, will not start anticoagulation  -continue to monitor on telemetry   -cardiology consulted, and following, appreciate recs   -chest pain/discomfort may be related to palpitations and PVCs, will change metoprolol titrate to metoprolol succinate 25mg daily for long acting coverage   -continue DAPT with ASA 81 daily and Brilinta 90mg BID   -start isosorbide mononitrate 30mg daily for anginal prevention   -ECG PRN for chest pain   -previous echo 10/2023 with EF 64% and normal LV and RV function   -f/u A1c and lipid profile Patient presenting with chest pain that is worse with rest and not aggravated by exertion. Pain is described as a chest heaviness.   -cardiac enzymes (trops and CKMB) negative   -EKG with no ST segment changes   -patient's cardiac history makes him high risk for ACS, unlikely ACS at this time, will not start anticoagulation  -continue to monitor on telemetry   -Formal Cardiology consult requested, appreciate recs   -chest pain/discomfort may be related to palpitations and PVCs, will change metoprolol titrate to metoprolol succinate 25mg daily for long acting coverage   -continue DAPT with ASA 81 daily and Brilinta 90mg BID   -start isosorbide mononitrate 30mg daily for anginal prevention   -ECG PRN for chest pain   -previous echo 10/2023 with EF 64% and normal LV and RV function   -f/u A1c and lipid profile  -c/w Protonix PO

## 2024-02-20 NOTE — ED PROVIDER NOTE - CONSIDERATION OF ADMISSION OBSERVATION
Given concern of ACS with cardiac history, will likely require OBS for admission for further work up. Consideration of Admission/Observation

## 2024-02-20 NOTE — ED ADULT NURSE REASSESSMENT NOTE - NS ED NURSE REASSESS COMMENT FT1
Pt is A&Ox3, appears comfortable, offers no new complaints at this time. breathing is even and nonlabored. Denies pain or SOB. NSR on cardiac monitor. Pt admitted to telemetry, pending bed assignment. Plan of care ongoing. Stretcher set in lowest position, call bell within reach, safety maintained.

## 2024-02-20 NOTE — PATIENT PROFILE ADULT - FALL HARM RISK - HARM RISK INTERVENTIONS

## 2024-02-20 NOTE — ED PROVIDER NOTE - CLINICAL SUMMARY MEDICAL DECISION MAKING FREE TEXT BOX
54 y/o male with PMHx of CAD s/p 4 stents and HTN who presented ot the ED for chest pain X 2 wks.   Concern for ACS  Labs, EKG, CXR, Analgesia

## 2024-02-20 NOTE — H&P ADULT - PROBLEM SELECTOR PLAN 2
Patient with subjective palpitations as well as PVCs on telemetry monitoring.   -continue to monitor on telemetry   -metoprolol succinate 25mg daily EKG: PVCs  Patient with subjective palpitations as well as PVCs on telemetry monitoring.     -continue to monitor on telemetry   -metoprolol succinate 25mg daily  -f/u Cardiology recs EKG: PVCs  Patient with subjective palpitations as well as PVCs on telemetry monitoring.     -continue to monitor on telemetry   -metoprolol succinate 25mg daily  -f/u Cardiology recs  -May need higher dose of BB

## 2024-02-20 NOTE — H&P ADULT - NSHPPHYSICALEXAM_GEN_ALL_CORE
VITALS:   T(C): 36.7 (02-20-24 @ 22:30), Max: 36.8 (02-20-24 @ 15:45)  HR: 68 (02-20-24 @ 22:30) (57 - 68)  BP: 132/85 (02-20-24 @ 22:30) (113/90 - 132/85)  RR: 18 (02-20-24 @ 22:30) (17 - 18)  SpO2: 98% (02-20-24 @ 22:30) (98% - 100%)    GENERAL: NAD, lying in bed comfortably  HEAD:  Atraumatic, Normocephalic  EYES: EOMI, PERRLA, conjunctiva and sclera clear  ENT: Moist mucous membranes  NECK: Supple, No JVD  CHEST/LUNG: Clear to auscultation bilaterally; No rales, rhonchi, wheezing, or rubs. Unlabored respirations, no tenderness or reproducible pain when chest palpated b/l   HEART: Regular rate and rhythm; No murmurs, rubs, or gallops  ABDOMEN: BSx4; Soft, nontender, nondistended  EXTREMITIES:  2+ Peripheral Pulses, brisk capillary refill. No clubbing, cyanosis, or edema  NERVOUS SYSTEM:  A&Ox3, no focal deficits   SKIN: No rashes or lesions  Psych: Normal speech, normal behavior, normal affect

## 2024-02-21 ENCOUNTER — TRANSCRIPTION ENCOUNTER (OUTPATIENT)
Age: 54
End: 2024-02-21

## 2024-02-21 VITALS
OXYGEN SATURATION: 99 % | HEART RATE: 54 BPM | SYSTOLIC BLOOD PRESSURE: 107 MMHG | DIASTOLIC BLOOD PRESSURE: 67 MMHG | TEMPERATURE: 98 F | RESPIRATION RATE: 18 BRPM

## 2024-02-21 DIAGNOSIS — I25.10 ATHEROSCLEROTIC HEART DISEASE OF NATIVE CORONARY ARTERY WITHOUT ANGINA PECTORIS: ICD-10-CM

## 2024-02-21 DIAGNOSIS — R07.89 OTHER CHEST PAIN: ICD-10-CM

## 2024-02-21 DIAGNOSIS — R00.2 PALPITATIONS: ICD-10-CM

## 2024-02-21 DIAGNOSIS — Z29.9 ENCOUNTER FOR PROPHYLACTIC MEASURES, UNSPECIFIED: ICD-10-CM

## 2024-02-21 DIAGNOSIS — I10 ESSENTIAL (PRIMARY) HYPERTENSION: ICD-10-CM

## 2024-02-21 DIAGNOSIS — R53.83 OTHER FATIGUE: ICD-10-CM

## 2024-02-21 LAB
A1C WITH ESTIMATED AVERAGE GLUCOSE RESULT: 5.8 % — HIGH (ref 4–5.6)
ALBUMIN SERPL ELPH-MCNC: 4.2 G/DL — SIGNIFICANT CHANGE UP (ref 3.3–5)
ALP SERPL-CCNC: 71 U/L — SIGNIFICANT CHANGE UP (ref 40–120)
ALT FLD-CCNC: 25 U/L — SIGNIFICANT CHANGE UP (ref 4–41)
ANION GAP SERPL CALC-SCNC: 14 MMOL/L — SIGNIFICANT CHANGE UP (ref 7–14)
AST SERPL-CCNC: 21 U/L — SIGNIFICANT CHANGE UP (ref 4–40)
BASOPHILS # BLD AUTO: 0.07 K/UL — SIGNIFICANT CHANGE UP (ref 0–0.2)
BASOPHILS NFR BLD AUTO: 0.9 % — SIGNIFICANT CHANGE UP (ref 0–2)
BILIRUB SERPL-MCNC: 0.4 MG/DL — SIGNIFICANT CHANGE UP (ref 0.2–1.2)
BUN SERPL-MCNC: 14 MG/DL — SIGNIFICANT CHANGE UP (ref 7–23)
CALCIUM SERPL-MCNC: 9.6 MG/DL — SIGNIFICANT CHANGE UP (ref 8.4–10.5)
CHLORIDE SERPL-SCNC: 103 MMOL/L — SIGNIFICANT CHANGE UP (ref 98–107)
CHOLEST SERPL-MCNC: 98 MG/DL — SIGNIFICANT CHANGE UP
CO2 SERPL-SCNC: 21 MMOL/L — LOW (ref 22–31)
CREAT SERPL-MCNC: 0.82 MG/DL — SIGNIFICANT CHANGE UP (ref 0.5–1.3)
EGFR: 104 ML/MIN/1.73M2 — SIGNIFICANT CHANGE UP
EOSINOPHIL # BLD AUTO: 0.14 K/UL — SIGNIFICANT CHANGE UP (ref 0–0.5)
EOSINOPHIL NFR BLD AUTO: 1.9 % — SIGNIFICANT CHANGE UP (ref 0–6)
ESTIMATED AVERAGE GLUCOSE: 120 — SIGNIFICANT CHANGE UP
FOLATE SERPL-MCNC: 11.8 NG/ML — SIGNIFICANT CHANGE UP (ref 3.1–17.5)
GLUCOSE SERPL-MCNC: 122 MG/DL — HIGH (ref 70–99)
HCT VFR BLD CALC: 45.6 % — SIGNIFICANT CHANGE UP (ref 39–50)
HDLC SERPL-MCNC: 28 MG/DL — LOW
HGB BLD-MCNC: 14.9 G/DL — SIGNIFICANT CHANGE UP (ref 13–17)
IANC: 4.25 K/UL — SIGNIFICANT CHANGE UP (ref 1.8–7.4)
IMM GRANULOCYTES NFR BLD AUTO: 0.3 % — SIGNIFICANT CHANGE UP (ref 0–0.9)
LIPID PNL WITH DIRECT LDL SERPL: 48 MG/DL — SIGNIFICANT CHANGE UP
LYMPHOCYTES # BLD AUTO: 2.31 K/UL — SIGNIFICANT CHANGE UP (ref 1–3.3)
LYMPHOCYTES # BLD AUTO: 31 % — SIGNIFICANT CHANGE UP (ref 13–44)
MAGNESIUM SERPL-MCNC: 2.3 MG/DL — SIGNIFICANT CHANGE UP (ref 1.6–2.6)
MCHC RBC-ENTMCNC: 25.3 PG — LOW (ref 27–34)
MCHC RBC-ENTMCNC: 32.7 GM/DL — SIGNIFICANT CHANGE UP (ref 32–36)
MCV RBC AUTO: 77.6 FL — LOW (ref 80–100)
MONOCYTES # BLD AUTO: 0.67 K/UL — SIGNIFICANT CHANGE UP (ref 0–0.9)
MONOCYTES NFR BLD AUTO: 9 % — SIGNIFICANT CHANGE UP (ref 2–14)
NEUTROPHILS # BLD AUTO: 4.25 K/UL — SIGNIFICANT CHANGE UP (ref 1.8–7.4)
NEUTROPHILS NFR BLD AUTO: 56.9 % — SIGNIFICANT CHANGE UP (ref 43–77)
NON HDL CHOLESTEROL: 70 MG/DL — SIGNIFICANT CHANGE UP
NRBC # BLD: 0 /100 WBCS — SIGNIFICANT CHANGE UP (ref 0–0)
NRBC # FLD: 0 K/UL — SIGNIFICANT CHANGE UP (ref 0–0)
PHOSPHATE SERPL-MCNC: 4.2 MG/DL — SIGNIFICANT CHANGE UP (ref 2.5–4.5)
PLATELET # BLD AUTO: 412 K/UL — HIGH (ref 150–400)
POTASSIUM SERPL-MCNC: 4 MMOL/L — SIGNIFICANT CHANGE UP (ref 3.5–5.3)
POTASSIUM SERPL-SCNC: 4 MMOL/L — SIGNIFICANT CHANGE UP (ref 3.5–5.3)
PROT SERPL-MCNC: 7 G/DL — SIGNIFICANT CHANGE UP (ref 6–8.3)
RBC # BLD: 5.88 M/UL — HIGH (ref 4.2–5.8)
RBC # FLD: 16.2 % — HIGH (ref 10.3–14.5)
SODIUM SERPL-SCNC: 138 MMOL/L — SIGNIFICANT CHANGE UP (ref 135–145)
TRIGL SERPL-MCNC: 110 MG/DL — SIGNIFICANT CHANGE UP
TSH SERPL-MCNC: 4.19 UIU/ML — SIGNIFICANT CHANGE UP (ref 0.27–4.2)
VIT B12 SERPL-MCNC: 508 PG/ML — SIGNIFICANT CHANGE UP (ref 200–900)
WBC # BLD: 7.46 K/UL — SIGNIFICANT CHANGE UP (ref 3.8–10.5)
WBC # FLD AUTO: 7.46 K/UL — SIGNIFICANT CHANGE UP (ref 3.8–10.5)

## 2024-02-21 PROCEDURE — 99238 HOSP IP/OBS DSCHRG MGMT 30/<: CPT

## 2024-02-21 PROCEDURE — 99223 1ST HOSP IP/OBS HIGH 75: CPT

## 2024-02-21 RX ORDER — TICAGRELOR 90 MG/1
90 TABLET ORAL EVERY 12 HOURS
Refills: 0 | Status: DISCONTINUED | OUTPATIENT
Start: 2024-02-21 | End: 2024-02-21

## 2024-02-21 RX ORDER — METOPROLOL TARTRATE 50 MG
1 TABLET ORAL
Qty: 30 | Refills: 0
Start: 2024-02-21 | End: 2024-03-21

## 2024-02-21 RX ORDER — ISOSORBIDE MONONITRATE 60 MG/1
1 TABLET, EXTENDED RELEASE ORAL
Qty: 30 | Refills: 0
Start: 2024-02-21 | End: 2024-03-21

## 2024-02-21 RX ADMIN — TICAGRELOR 90 MILLIGRAM(S): 90 TABLET ORAL at 09:16

## 2024-02-21 RX ADMIN — LOSARTAN POTASSIUM 25 MILLIGRAM(S): 100 TABLET, FILM COATED ORAL at 05:25

## 2024-02-21 RX ADMIN — PANTOPRAZOLE SODIUM 40 MILLIGRAM(S): 20 TABLET, DELAYED RELEASE ORAL at 05:25

## 2024-02-21 RX ADMIN — Medication 81 MILLIGRAM(S): at 11:25

## 2024-02-21 RX ADMIN — ATORVASTATIN CALCIUM 80 MILLIGRAM(S): 80 TABLET, FILM COATED ORAL at 00:02

## 2024-02-21 RX ADMIN — Medication 25 MILLIGRAM(S): at 05:25

## 2024-02-21 NOTE — PROGRESS NOTE ADULT - PROBLEM SELECTOR PLAN 4
Patient with reports of fatigue, mild lightheadedness, no syncopal episodes. Etiology of fatigue is unclear at this time.   -TTE from 10/2023 with EF of 64% with no LV or RV abnormalities   -fatigue seems associated with onset of chest heaviness and palpitations   -fatigue possibly associated with PVCs/palpitations/arrythmia   -monitor of tele   -euthyroid TSH 4.19, vitamin levels wnl B12 508, folate 11.8

## 2024-02-21 NOTE — PROGRESS NOTE ADULT - PROBLEM SELECTOR PLAN 2
EKG: PVCs  Patient with subjective palpitations as well as PVCs on telemetry monitoring.     -continue to monitor on telemetry   -metoprolol succinate 25mg daily  -f/u Cardiology recs  -May need higher dose of BB

## 2024-02-21 NOTE — DISCHARGE NOTE PROVIDER - NSDCCPCAREPLAN_GEN_ALL_CORE_FT
PRINCIPAL DISCHARGE DIAGNOSIS  Diagnosis: Chest pain  Assessment and Plan of Treatment: you were ruled out for heart attack, continue all cardiac meds as directed, metoprolol changed to toprol and started you on imdur for angina pain prevention. Please f/up with cardiologist Dr. Burrows as outpt

## 2024-02-21 NOTE — DISCHARGE NOTE PROVIDER - HOSPITAL COURSE
53 y M with a PMHx of CAD (IW STEMI in Oct 23 s/p PCI w/ 2 stents to mid RCA, and recent presentation on 2/9 found have 70% stenosis of LAD w/ stent placement) presenting today with chest pain/heaviness that has been ongoing for the last 2-3 weeks. Given patient's presentation, suspicion for ACS is low at this time and is likely anginal pain. Patient admitted for further monitoring on telemetry.        Problem/Plan - 1:  ·  Problem: Atypical chest pain.   ·  Plan: Patient presenting with chest pain that is worse with rest and not aggravated by exertion. Pain is described as a chest heaviness.   -cardiac enzymes (trops and CKMB) negative , troponin 13-15, ck 90, mb<1  -EKG with no ST segment changes   - recent echo LVEF 64% in 10/2023, no need to repeat echo per Cards   -patient's cardiac history makes him high risk for ACS, unlikely ACS at this time, will not start anticoagulation  -continue to monitor on telemetry   -per Cards, no further intervention at this time  -chest pain/discomfort may be related to palpitations and PVCs, will change metoprolol titrate to metoprolol succinate 25mg daily for long acting coverage   -continue DAPT with ASA 81 daily and Brilinta 90mg BID  - started on imdur 30 mg qd for angina prevention    -ECG PRN for chest pain   -previous echo 10/2023 with EF 64% and normal LV and RV function   -A1c 5.8%, lipid panel reviewed  -c/w Protonix PO  - Dispo: cleared by cardiology for discharge home today.     Problem/Plan - 2:  ·  Problem: Palpitations.   ·  Plan: EKG: PVCs  Patient with subjective palpitations as well as PVCs on telemetry monitoring.     -continue to monitor on telemetry   -metoprolol succinate 25mg daily  -f/u Cardiology recs  -May need higher dose of BB.     Problem/Plan - 3:  ·  Problem: CAD (coronary artery disease).   ·  Plan: Patient with history of CAD s/p 4 stents   -continue DAPT, ASA 81 daily and Brilinta 90 BID   -continue atorvastatin.     Problem/Plan - 4:  ·  Problem: Fatigue.   ·  Plan: Patient with reports of fatigue, mild lightheadedness, no syncopal episodes. Etiology of fatigue is unclear at this time.   -TTE from 10/2023 with EF of 64% with no LV or RV abnormalities   -fatigue seems associated with onset of chest heaviness and palpitations   -fatigue possibly associated with PVCs/palpitations/arrythmia   -monitor of tele   -euthyroid TSH 4.19, vitamin levels wnl B12 508, folate 11.8.     Problem/Plan - 5:  ·  Problem: HTN (hypertension).   ·  Plan: Patient with history of hypertension   -continue home losartan   -change home metoprolol tartrate to metoprolol succinate 25mg daily.

## 2024-02-21 NOTE — DISCHARGE NOTE NURSING/CASE MANAGEMENT/SOCIAL WORK - NSFLUVACAGEDISCH_IMM_ALL_CORE
Detail Level: Detailed
Quality 111:Pneumonia Vaccination Status For Older Adults: Pneumococcal Vaccination Previously Received
Quality 110: Preventive Care And Screening: Influenza Immunization: Influenza Immunization previously received during influenza season
Adult

## 2024-02-21 NOTE — DISCHARGE NOTE PROVIDER - NSDCFUSCHEDAPPT_GEN_ALL_CORE_FT
Constantine Burrows  [facility] Physician [facility]  CARDIOLOGY [id number]  Scheduled Appointment: 02/28/2024

## 2024-02-21 NOTE — PROGRESS NOTE ADULT - SUBJECTIVE AND OBJECTIVE BOX
Dr. Laurie Morgan  Pager [phone]    PROGRESS NOTE:     Patient is a 53y old  Male who presents with a chief complaint of chest pain/heaviness s/p LAD stent placement on 2/9 (20 Feb 2024 22:58)      SUBJECTIVE / OVERNIGHT EVENTS: denies chest pain or sob   ADDITIONAL REVIEW OF SYSTEMS: afebrile , reports recent stent placement on 2/9    MEDICATIONS  (STANDING):  aspirin enteric coated 81 milliGRAM(s) Oral daily  atorvastatin 80 milliGRAM(s) Oral at bedtime  isosorbide   mononitrate ER Tablet (IMDUR) 30 milliGRAM(s) Oral daily  losartan 25 milliGRAM(s) Oral daily  metoprolol succinate ER 25 milliGRAM(s) Oral daily  pantoprazole    Tablet 40 milliGRAM(s) Oral before breakfast  ticagrelor 90 milliGRAM(s) Oral every 12 hours    MEDICATIONS  (PRN):      CAPILLARY BLOOD GLUCOSE        I&O's Summary      PHYSICAL EXAM:  Vital Signs Last 24 Hrs  T(C): 36.7 (21 Feb 2024 10:53), Max: 36.8 (20 Feb 2024 15:45)  T(F): 98.1 (21 Feb 2024 10:53), Max: 98.3 (20 Feb 2024 18:55)  HR: 54 (21 Feb 2024 10:53) (54 - 68)  BP: 107/67 (21 Feb 2024 10:53) (107/67 - 132/85)  BP(mean): 98 (20 Feb 2024 15:45) (98 - 98)  RR: 18 (21 Feb 2024 10:53) (17 - 18)  SpO2: 99% (21 Feb 2024 10:53) (98% - 100%)    Parameters below as of 21 Feb 2024 10:53  Patient On (Oxygen Delivery Method): room air      CONSTITUTIONAL:   RESPIRATORY: Normal respiratory effort; lungs are clear to auscultation bilaterally  CARDIOVASCULAR: Regular rate and rhythm, normal S1 and S2, no murmur/rub/gallop; No lower extremity edema; Peripheral pulses are 2+ bilaterally  ABDOMEN: Nontender to palpation, normoactive bowel sounds, no rebound/guarding; No hepatosplenomegaly  MUSCULOSKELETAL: no clubbing or cyanosis of digits; no joint swelling or tenderness to palpation  PSYCH: A+O to person, place, and time; affect appropriate    LABS:                        14.9   7.46  )-----------( 412      ( 21 Feb 2024 06:00 )             45.6     02-21    138  |  103  |  14  ----------------------------<  122<H>  4.0   |  21<L>  |  0.82    Ca    9.6      21 Feb 2024 06:00  Phos  4.2     02-21  Mg     2.30     02-21    TPro  7.0  /  Alb  4.2  /  TBili  0.4  /  DBili  x   /  AST  21  /  ALT  25  /  AlkPhos  71  02-21      CARDIAC MARKERS ( 20 Feb 2024 21:49 )  x     / x     / 90 U/L / x     / <1.0 ng/mL      Urinalysis Basic - ( 21 Feb 2024 06:00 )    Color: x / Appearance: x / SG: x / pH: x  Gluc: 122 mg/dL / Ketone: x  / Bili: x / Urobili: x   Blood: x / Protein: x / Nitrite: x   Leuk Esterase: x / RBC: x / WBC x   Sq Epi: x / Non Sq Epi: x / Bacteria: x      RADIOLOGY & ADDITIONAL TESTS:  Results Reviewed:   Imaging Personally Reviewed:  < from: Xray Chest 2 Views PA/Lat (02.20.24 @ 17:53) >  IMPRESSION:  Clear lungs.      Electrocardiogram Personally Reviewed:    COORDINATION OF CARE:  Care Discussed with Consultants/Other Providers [Y/N]:  Prior or Outpatient Records Reviewed [Y/N]:

## 2024-02-21 NOTE — PROGRESS NOTE ADULT - PROBLEM SELECTOR PLAN 1
Patient presenting with chest pain that is worse with rest and not aggravated by exertion. Pain is described as a chest heaviness.   -cardiac enzymes (trops and CKMB) negative , troponin 13-15, ck 90, mb<1  -EKG with no ST segment changes   - recent echo LVEF 64% in 10/2023, no need to repeat echo per Cards   -patient's cardiac history makes him high risk for ACS, unlikely ACS at this time, will not start anticoagulation  -continue to monitor on telemetry   -per Cards, no further intervention at this time  -chest pain/discomfort may be related to palpitations and PVCs, will change metoprolol titrate to metoprolol succinate 25mg daily for long acting coverage   -continue DAPT with ASA 81 daily and Brilinta 90mg BID  - started on imdur 30 mg qd for angina prevention    -ECG PRN for chest pain   -previous echo 10/2023 with EF 64% and normal LV and RV function   -A1c 5.8%, lipid panel reviewed  -c/w Protonix PO  - Dispo: cleared by cardiology for discharge home today.

## 2024-02-21 NOTE — DISCHARGE NOTE PROVIDER - CARE PROVIDER_API CALL
Constantine Burrows  Interventional Cardiology  [id number] [place] [address], [address]  [place] [address]  Phone: [phone]  Fax: [phone]  Follow Up Time:

## 2024-02-21 NOTE — DISCHARGE NOTE PROVIDER - NSDCMRMEDTOKEN_GEN_ALL_CORE_FT
Aspirin Enteric Coated 81 mg oral delayed release tablet: 1 tab(s) orally once a day  atorvastatin 80 mg oral tablet: 1 tab(s) orally once a day (at bedtime)  Brilinta (ticagrelor) 90 mg oral tablet: 1 tab(s) orally 2 times a day  isosorbide mononitrate 30 mg oral tablet, extended release: 1 tab(s) orally once a day  losartan 25 mg oral tablet: 1 tab(s) orally once a day  metoprolol succinate 25 mg oral tablet, extended release: 1 tab(s) orally once a day  omega-3 polyunsaturated fatty acids 1000 mg oral capsule: 1 cap(s) orally once a day  pantoprazole 40 mg oral delayed release tablet: 1 tab(s) orally once a day (before a meal)

## 2024-02-21 NOTE — DISCHARGE NOTE NURSING/CASE MANAGEMENT/SOCIAL WORK - NSDCPEFALRISK_GEN_ALL_CORE
For information on Fall & Injury Prevention, visit: [email or web address] OR  [email or web address] OR  https://www.cdc.gov/steadi/patient.html

## 2024-02-21 NOTE — DISCHARGE NOTE NURSING/CASE MANAGEMENT/SOCIAL WORK - PATIENT PORTAL LINK FT
You can access the FollowMyHealth Patient Portal offered by [facility] by registering at the following website: [email or web address]. By joining [employer]’s FollowMyHealth portal, you will also be able to view your health information using other applications (apps) compatible with our system.

## 2024-02-28 ENCOUNTER — APPOINTMENT (OUTPATIENT)
Dept: CARDIOLOGY | Facility: CLINIC | Age: 54
End: 2024-02-28
Payer: MEDICAID

## 2024-02-28 ENCOUNTER — NON-APPOINTMENT (OUTPATIENT)
Age: 54
End: 2024-02-28

## 2024-02-28 VITALS
HEIGHT: 65 IN | WEIGHT: 149 LBS | OXYGEN SATURATION: 95 % | SYSTOLIC BLOOD PRESSURE: 96 MMHG | HEART RATE: 71 BPM | BODY MASS INDEX: 24.83 KG/M2 | DIASTOLIC BLOOD PRESSURE: 56 MMHG

## 2024-02-28 VITALS — SYSTOLIC BLOOD PRESSURE: 127 MMHG | DIASTOLIC BLOOD PRESSURE: 81 MMHG

## 2024-02-28 DIAGNOSIS — Z87.898 PERSONAL HISTORY OF OTHER SPECIFIED CONDITIONS: ICD-10-CM

## 2024-02-28 PROCEDURE — 93000 ELECTROCARDIOGRAM COMPLETE: CPT

## 2024-02-28 PROCEDURE — 99214 OFFICE O/P EST MOD 30 MIN: CPT | Mod: 25

## 2024-02-28 PROCEDURE — G2211 COMPLEX E/M VISIT ADD ON: CPT | Mod: NC,1L

## 2024-02-28 RX ORDER — METOPROLOL SUCCINATE 25 MG/1
25 TABLET, EXTENDED RELEASE ORAL
Qty: 90 | Refills: 3 | Status: DISCONTINUED | COMMUNITY
Start: 1900-01-01 | End: 2024-02-28

## 2024-02-28 RX ORDER — LOSARTAN POTASSIUM 25 MG/1
25 TABLET, FILM COATED ORAL DAILY
Qty: 90 | Refills: 3 | Status: DISCONTINUED | COMMUNITY
Start: 1900-01-01 | End: 2024-02-28

## 2024-03-01 DIAGNOSIS — R12 HEARTBURN: ICD-10-CM

## 2024-03-01 RX ORDER — PANTOPRAZOLE 40 MG/1
40 TABLET, DELAYED RELEASE ORAL DAILY
Qty: 30 | Refills: 5 | Status: ACTIVE | COMMUNITY
Start: 2024-03-01 | End: 1900-01-01

## 2024-03-01 NOTE — HISTORY OF PRESENT ILLNESS
[FreeTextEntry1] : Currently doing okay but doesn't feel himself. Denies chest pain or shortness of breath. Occasional "vibrations in the heart" unrelated to exertion. Denies dizziness. Feels weakness. BP has been running low at times. Recently admitted to [facility] after developing chest pain while in process of getting outpatient stress test. Gemini negative. CTPA negative for PE. Underwent cath and subsequently had mid LAD PCI.

## 2024-03-01 NOTE — DISCUSSION/SUMMARY
[Coronary Artery Disease] : coronary artery disease [Stable] : stable [Hypertension] : hypertension [EKG obtained to assist in diagnosis and management of assessed problem(s)] : EKG obtained to assist in diagnosis and management of assessed problem(s) [FreeTextEntry1] : Currently stable from a cardiovascular standpoint. Normotensive. Euvolemic. Stable CAD (s/p STEMI -> RCA stents, mid LAD 60%/mid LAD 50% -> recent PCI) with preserved LV systolic function (LVEF 63%). Continue to have atypical chest symptoms of unclear origin. Consider GI etiology. Continue current medications including aspirin and Brilinta. ECG completed today and reviewed (findings as noted above). Recent cath results reviewed. Follow up in 2 weeks.

## 2024-03-01 NOTE — CARDIOLOGY SUMMARY
[de-identified] : 02/28/24 - normal sinus rhythm, old inferior infarct [de-identified] : 10/05/23 - normal LA, normal LV and RV size and function, LVEF 64% [de-identified] : 02/09/24 (PCI) - QIANA FRONTIER stent to mLAD 60% and mLAD 50% 02/09/24 (CATH) - pLAD 20%, mLAD 60%, mLAD 50% (iFR 0.76), oD1 30%, pRCA 20%, mRCA 20%, pRPDA 30% 10/04/23 (PCI) - QIANA FRONTIER stents to mRCA 100% and pRCA 60% 10/04/23 (CATH) - mLAD 60%, dLAD 20%, pRCA 60%, mRCA 100%, LVEF 40%

## 2024-03-01 NOTE — REVIEW OF SYSTEMS
[Palpitations] : palpitations [Negative] : Musculoskeletal [Dyspnea on exertion] : not dyspnea during exertion [SOB] : no shortness of breath [Chest Discomfort] : no chest discomfort [Lower Ext Edema] : no extremity edema [Orthopnea] : no orthopnea [Leg Claudication] : no intermittent leg claudication [PND] : no PND [Syncope] : no syncope

## 2024-03-05 RX ORDER — TICAGRELOR 90 MG/1
90 TABLET ORAL TWICE DAILY
Qty: 180 | Refills: 3 | Status: DISCONTINUED | COMMUNITY
Start: 1900-01-01 | End: 2024-03-05

## 2024-03-13 ENCOUNTER — NON-APPOINTMENT (OUTPATIENT)
Age: 54
End: 2024-03-13

## 2024-03-13 ENCOUNTER — APPOINTMENT (OUTPATIENT)
Dept: CARDIOLOGY | Facility: CLINIC | Age: 54
End: 2024-03-13
Payer: MEDICAID

## 2024-03-13 VITALS
WEIGHT: 149 LBS | HEIGHT: 65 IN | HEART RATE: 80 BPM | DIASTOLIC BLOOD PRESSURE: 83 MMHG | SYSTOLIC BLOOD PRESSURE: 134 MMHG | OXYGEN SATURATION: 99 % | BODY MASS INDEX: 24.83 KG/M2

## 2024-03-13 PROCEDURE — 99214 OFFICE O/P EST MOD 30 MIN: CPT | Mod: 25

## 2024-03-13 PROCEDURE — 93000 ELECTROCARDIOGRAM COMPLETE: CPT

## 2024-03-13 RX ORDER — CLOPIDOGREL BISULFATE 75 MG/1
75 TABLET, FILM COATED ORAL DAILY
Qty: 97 | Refills: 3 | Status: ACTIVE | COMMUNITY
Start: 2024-03-05 | End: 1900-01-01

## 2024-03-13 NOTE — HISTORY OF PRESENT ILLNESS
[FreeTextEntry1] : Continues to feel palpitation feeling. Also gets weak feeling. Symptoms usually comes when he lies down at night. Denies shortness of breath.

## 2024-03-13 NOTE — REVIEW OF SYSTEMS
[Palpitations] : palpitations [Negative] : Musculoskeletal [SOB] : no shortness of breath [Dyspnea on exertion] : not dyspnea during exertion [Chest Discomfort] : no chest discomfort [Lower Ext Edema] : no extremity edema [Leg Claudication] : no intermittent leg claudication [Orthopnea] : no orthopnea [PND] : no PND [Syncope] : no syncope

## 2024-03-13 NOTE — DISCUSSION/SUMMARY
[Coronary Artery Disease] : coronary artery disease [Hypertension] : hypertension [Stable] : stable [FreeTextEntry1] : Currently stable from a cardiovascular standpoint. Normotensive. Euvolemic. Stable CAD (s/p STEMI -> RCA stents, mid LAD 60%/mid LAD 50% -> PCI) with preserved LV systolic function (LVEF 63%). Continues to have atypical chest symptoms/palpitations of unclear origin. Consider GI etiology. Patient to try to switch from ticagrelor to clopidogrel to see if symptoms improve. Continue all other current medications aspirin and pantoprazole. ECG completed today and reviewed (findings as noted above). Follow up in 2-3 months. Recommend GI evaluation. [EKG obtained to assist in diagnosis and management of assessed problem(s)] : EKG obtained to assist in diagnosis and management of assessed problem(s)

## 2024-03-13 NOTE — CARDIOLOGY SUMMARY
[de-identified] : 03/13/24 - normal sinus rhythm, old inferior infarct [de-identified] : 10/05/23 - normal LA, normal LV and RV size and function, LVEF 64% [de-identified] : 02/09/24 (PCI) - QIANA FRONTIER stent to mLAD 60% and mLAD 50% 02/09/24 (CATH) - pLAD 20%, mLAD 60%, mLAD 50% (iFR 0.76), oD1 30%, pRCA 20%, mRCA 20%, pRPDA 30% 10/04/23 (PCI) - QIANA FRONTIER stents to mRCA 100% and pRCA 60% 10/04/23 (CATH) - mLAD 60%, dLAD 20%, pRCA 60%, mRCA 100%, LVEF 40%

## 2024-03-13 NOTE — PHYSICAL EXAM
[Well Developed] : well developed [Well Nourished] : well nourished [Normal Conjunctiva] : normal conjunctiva [Normal Venous Pressure] : normal venous pressure [No Carotid Bruit] : no carotid bruit [Normal S1, S2] : normal S1, S2 [No Murmur] : no murmur [No Rub] : no rub [No Gallop] : no gallop [Clear Lung Fields] : clear lung fields [Good Air Entry] : good air entry [No Respiratory Distress] : no respiratory distress  [Non Tender] : non-tender [Soft] : abdomen soft [Normal Gait] : normal gait [No Edema] : no edema [No Cyanosis] : no cyanosis [No Rash] : no rash [No Skin Lesions] : no skin lesions [No Focal Deficits] : no focal deficits [Normal Speech] : normal speech [Moves all extremities] : moves all extremities [Alert and Oriented] : alert and oriented

## 2024-03-27 ENCOUNTER — APPOINTMENT (OUTPATIENT)
Dept: CARDIOLOGY | Facility: CLINIC | Age: 54
End: 2024-03-27

## 2024-05-01 ENCOUNTER — NON-APPOINTMENT (OUTPATIENT)
Age: 54
End: 2024-05-01

## 2024-06-12 ENCOUNTER — NON-APPOINTMENT (OUTPATIENT)
Age: 54
End: 2024-06-12

## 2024-06-12 ENCOUNTER — APPOINTMENT (OUTPATIENT)
Dept: CARDIOLOGY | Facility: CLINIC | Age: 54
End: 2024-06-12
Payer: MEDICAID

## 2024-06-12 VITALS
BODY MASS INDEX: 23.66 KG/M2 | DIASTOLIC BLOOD PRESSURE: 88 MMHG | HEIGHT: 65 IN | HEART RATE: 61 BPM | WEIGHT: 142 LBS | SYSTOLIC BLOOD PRESSURE: 137 MMHG | OXYGEN SATURATION: 98 %

## 2024-06-12 DIAGNOSIS — I25.10 ATHEROSCLEROTIC HEART DISEASE OF NATIVE CORONARY ARTERY W/OUT ANGINA PECTORIS: ICD-10-CM

## 2024-06-12 DIAGNOSIS — Z78.9 OTHER SPECIFIED HEALTH STATUS: ICD-10-CM

## 2024-06-12 DIAGNOSIS — I10 ESSENTIAL (PRIMARY) HYPERTENSION: ICD-10-CM

## 2024-06-12 PROCEDURE — 93000 ELECTROCARDIOGRAM COMPLETE: CPT

## 2024-06-12 PROCEDURE — 99214 OFFICE O/P EST MOD 30 MIN: CPT | Mod: 25

## 2024-06-12 NOTE — DISCUSSION/SUMMARY
[Coronary Artery Disease] : coronary artery disease [Hypertension] : hypertension [Stable] : stable [FreeTextEntry1] : Currently stable from a cardiovascular standpoint. Normotensive. Euvolemic. Stable CAD (s/p STEMI -> RCA stents, mid LAD 60%/mid LAD 50% -> PCI) with preserved LV systolic function (LVEF 63%). Continues to have atypical chest symptoms/palpitations of unclear origin. Consider GI etiology. Patient to have endoscopy in upcoming weeks. Continue current medications aspirin, clopidogrel and pantoprazole. ECG completed today and reviewed (findings as noted above). Follow up in 4 months. At this time, patient is considered an acceptable risk from a cardiac standpoint for the anticipated endoscopy procedure. [EKG obtained to assist in diagnosis and management of assessed problem(s)] : EKG obtained to assist in diagnosis and management of assessed problem(s)

## 2024-06-12 NOTE — HISTORY OF PRESENT ILLNESS
[FreeTextEntry1] : Currently doing okay. Denies chest pain or shortness of breath. Continues to experience palpitations. Anticipating endoscopy in upcoming weeks.

## 2024-06-12 NOTE — PHYSICAL EXAM
[Well Developed] : well developed [Well Nourished] : well nourished [Normal Conjunctiva] : normal conjunctiva [Normal Venous Pressure] : normal venous pressure [No Carotid Bruit] : no carotid bruit [Normal S1, S2] : normal S1, S2 [No Murmur] : no murmur [No Rub] : no rub [No Gallop] : no gallop [Clear Lung Fields] : clear lung fields [Good Air Entry] : good air entry [No Respiratory Distress] : no respiratory distress  [Soft] : abdomen soft [Non Tender] : non-tender [Normal Gait] : normal gait [No Edema] : no edema [No Cyanosis] : no cyanosis [No Rash] : no rash [No Skin Lesions] : no skin lesions [Moves all extremities] : moves all extremities [No Focal Deficits] : no focal deficits [Normal Speech] : normal speech [Alert and Oriented] : alert and oriented

## 2024-06-12 NOTE — CARDIOLOGY SUMMARY
[de-identified] : 06/12/24 - normal sinus rhythm, old inferior infarct [de-identified] : 10/05/23 - normal LA, normal LV and RV size and function, LVEF 64% [de-identified] : 02/09/24 (PCI) - QIANA FRONTIER stent to mLAD 60% and mLAD 50% 02/09/24 (CATH) - pLAD 20%, mLAD 60%, mLAD 50% (iFR 0.76), oD1 30%, pRCA 20%, mRCA 20%, pRPDA 30% 10/04/23 (PCI) - QIANA FRONTIER stents to mRCA 100% and pRCA 60% 10/04/23 (CATH) - mLAD 60%, dLAD 20%, pRCA 60%, mRCA 100%, LVEF 40%

## 2024-07-10 NOTE — H&P ADULT - PROBLEM/PLAN-3
DISPLAY PLAN FREE TEXT General Sunscreen Counseling: I recommended a broad spectrum sunscreen with a SPF of 30 or higher.  I explained that SPF 30 sunscreens block approximately 97 percent of the sun's harmful rays.  Sunscreens should be applied at least 15 minutes prior to expected sun exposure and then every 2 hours after that as long as sun exposure continues. If swimming or exercising sunscreen should be reapplied every 45 minutes to an hour after getting wet or sweating.  One ounce, or the equivalent of a shot glass full of sunscreen, is adequate to protect the skin not covered by a bathing suit. I also recommended a lip balm with a sunscreen as well. Sun protective clothing can be used in lieu of sunscreen but must be worn the entire time you are exposed to the sun's rays. Detail Level: Detailed

## 2024-09-23 DIAGNOSIS — R00.2 PALPITATIONS: ICD-10-CM

## 2024-09-27 ENCOUNTER — APPOINTMENT (OUTPATIENT)
Dept: ELECTROPHYSIOLOGY | Facility: CLINIC | Age: 54
End: 2024-09-27

## 2024-10-16 ENCOUNTER — APPOINTMENT (OUTPATIENT)
Dept: CARDIOLOGY | Facility: CLINIC | Age: 54
End: 2024-10-16
Payer: MEDICAID

## 2024-10-16 ENCOUNTER — NON-APPOINTMENT (OUTPATIENT)
Age: 54
End: 2024-10-16

## 2024-10-16 VITALS
BODY MASS INDEX: 24.16 KG/M2 | HEIGHT: 65 IN | HEART RATE: 65 BPM | DIASTOLIC BLOOD PRESSURE: 79 MMHG | OXYGEN SATURATION: 99 % | SYSTOLIC BLOOD PRESSURE: 119 MMHG | WEIGHT: 145 LBS

## 2024-10-16 DIAGNOSIS — I10 ESSENTIAL (PRIMARY) HYPERTENSION: ICD-10-CM

## 2024-10-16 DIAGNOSIS — I25.10 ATHEROSCLEROTIC HEART DISEASE OF NATIVE CORONARY ARTERY W/OUT ANGINA PECTORIS: ICD-10-CM

## 2024-10-16 PROCEDURE — 99213 OFFICE O/P EST LOW 20 MIN: CPT | Mod: 25

## 2024-10-16 PROCEDURE — 93000 ELECTROCARDIOGRAM COMPLETE: CPT

## 2024-10-17 ENCOUNTER — NON-APPOINTMENT (OUTPATIENT)
Age: 54
End: 2024-10-17

## 2024-10-29 NOTE — CHART NOTE - NSCHARTNOTEFT_GEN_A_CORE
Removal of Radial Band    Pulses in the right upper extremity are palpable. The patient was placed in the supine position. The insertion site was identified and the band deflated per protocol. The radial band was removed slowly. Direct pressure was applied for 10 minutes.      Monitoring of the right wrists and both upper extremities including neuro-vascular checks and vital signs every 15 minutes x 4.    Complications: None    Comments: Activity restriction and reportable symptoms discussed with patient. Detail Level: Generalized Detail Level: Detailed

## 2025-01-10 ENCOUNTER — NON-APPOINTMENT (OUTPATIENT)
Age: 55
End: 2025-01-10

## 2025-03-04 RX ORDER — LOSARTAN POTASSIUM 25 MG/1
25 TABLET, FILM COATED ORAL DAILY
Refills: 0 | Status: ACTIVE | COMMUNITY

## 2025-03-05 ENCOUNTER — NON-APPOINTMENT (OUTPATIENT)
Age: 55
End: 2025-03-05

## 2025-03-05 ENCOUNTER — APPOINTMENT (OUTPATIENT)
Dept: CARDIOLOGY | Facility: CLINIC | Age: 55
End: 2025-03-05
Payer: MEDICAID

## 2025-03-05 VITALS
WEIGHT: 152 LBS | OXYGEN SATURATION: 99 % | DIASTOLIC BLOOD PRESSURE: 86 MMHG | SYSTOLIC BLOOD PRESSURE: 132 MMHG | BODY MASS INDEX: 25.33 KG/M2 | HEART RATE: 70 BPM | HEIGHT: 65 IN | TEMPERATURE: 97.9 F

## 2025-03-05 DIAGNOSIS — I25.10 ATHEROSCLEROTIC HEART DISEASE OF NATIVE CORONARY ARTERY W/OUT ANGINA PECTORIS: ICD-10-CM

## 2025-03-05 DIAGNOSIS — I10 ESSENTIAL (PRIMARY) HYPERTENSION: ICD-10-CM

## 2025-03-05 PROCEDURE — G2211 COMPLEX E/M VISIT ADD ON: CPT | Mod: NC

## 2025-03-05 PROCEDURE — 99213 OFFICE O/P EST LOW 20 MIN: CPT

## 2025-03-05 PROCEDURE — 93000 ELECTROCARDIOGRAM COMPLETE: CPT

## 2025-03-05 RX ORDER — ISOSORBIDE MONONITRATE 30 MG
30 TABLET, EXTENDED RELEASE 24 HR ORAL DAILY
Refills: 0 | Status: DISCONTINUED | COMMUNITY
End: 2025-03-05

## 2025-03-05 RX ORDER — TICAGRELOR 90 MG/1
90 TABLET ORAL TWICE DAILY
Refills: 0 | Status: DISCONTINUED | COMMUNITY
End: 2025-03-05

## 2025-03-05 RX ORDER — METOPROLOL SUCCINATE 25 MG/1
25 TABLET, EXTENDED RELEASE ORAL
Refills: 0 | Status: ACTIVE | COMMUNITY

## 2025-07-09 ENCOUNTER — APPOINTMENT (OUTPATIENT)
Dept: CARDIOLOGY | Facility: CLINIC | Age: 55
End: 2025-07-09
Payer: COMMERCIAL

## 2025-07-09 ENCOUNTER — NON-APPOINTMENT (OUTPATIENT)
Age: 55
End: 2025-07-09

## 2025-07-09 VITALS
OXYGEN SATURATION: 98 % | DIASTOLIC BLOOD PRESSURE: 82 MMHG | BODY MASS INDEX: 23.99 KG/M2 | HEART RATE: 82 BPM | WEIGHT: 144 LBS | TEMPERATURE: 98.4 F | SYSTOLIC BLOOD PRESSURE: 129 MMHG | HEIGHT: 65 IN

## 2025-07-09 PROCEDURE — 93000 ELECTROCARDIOGRAM COMPLETE: CPT

## 2025-07-09 PROCEDURE — 99214 OFFICE O/P EST MOD 30 MIN: CPT | Mod: 25
